# Patient Record
Sex: FEMALE | Race: WHITE | NOT HISPANIC OR LATINO | Employment: OTHER | URBAN - METROPOLITAN AREA
[De-identification: names, ages, dates, MRNs, and addresses within clinical notes are randomized per-mention and may not be internally consistent; named-entity substitution may affect disease eponyms.]

---

## 2017-02-17 ENCOUNTER — OFFICE VISIT (OUTPATIENT)
Dept: FAMILY MEDICINE | Facility: CLINIC | Age: 67
End: 2017-02-17
Payer: COMMERCIAL

## 2017-02-17 VITALS
WEIGHT: 139 LBS | OXYGEN SATURATION: 98 % | HEART RATE: 88 BPM | DIASTOLIC BLOOD PRESSURE: 68 MMHG | HEIGHT: 63 IN | SYSTOLIC BLOOD PRESSURE: 102 MMHG | RESPIRATION RATE: 16 BRPM | BODY MASS INDEX: 24.63 KG/M2

## 2017-02-17 DIAGNOSIS — M54.41 ACUTE BILATERAL LOW BACK PAIN WITH BILATERAL SCIATICA: Primary | ICD-10-CM

## 2017-02-17 DIAGNOSIS — M54.42 ACUTE BILATERAL LOW BACK PAIN WITH BILATERAL SCIATICA: Primary | ICD-10-CM

## 2017-02-17 PROCEDURE — 99213 OFFICE O/P EST LOW 20 MIN: CPT | Performed by: FAMILY MEDICINE

## 2017-02-17 ASSESSMENT — ANXIETY QUESTIONNAIRES
5. BEING SO RESTLESS THAT IT IS HARD TO SIT STILL: NOT AT ALL
2. NOT BEING ABLE TO STOP OR CONTROL WORRYING: NOT AT ALL
3. WORRYING TOO MUCH ABOUT DIFFERENT THINGS: NOT AT ALL
IF YOU CHECKED OFF ANY PROBLEMS ON THIS QUESTIONNAIRE, HOW DIFFICULT HAVE THESE PROBLEMS MADE IT FOR YOU TO DO YOUR WORK, TAKE CARE OF THINGS AT HOME, OR GET ALONG WITH OTHER PEOPLE: NOT DIFFICULT AT ALL
GAD7 TOTAL SCORE: 0
4. TROUBLE RELAXING: NOT AT ALL
7. FEELING AFRAID AS IF SOMETHING AWFUL MIGHT HAPPEN: NOT AT ALL
6. BECOMING EASILY ANNOYED OR IRRITABLE: NOT AT ALL
1. FEELING NERVOUS, ANXIOUS, OR ON EDGE: NOT AT ALL

## 2017-02-17 NOTE — NURSING NOTE
"Chief Complaint   Patient presents with     Musculoskeletal Problem     Eye Problem       Initial /68 (BP Location: Right arm, Patient Position: Chair, Cuff Size: Adult Regular)  Pulse 88  Ht 5' 3\" (1.6 m)  Wt 139 lb (63 kg)  SpO2 (!) 16%  PF 98 L/min  BMI 24.62 kg/m2 Estimated body mass index is 24.62 kg/(m^2) as calculated from the following:    Height as of this encounter: 5' 3\" (1.6 m).    Weight as of this encounter: 139 lb (63 kg).  Medication Reconciliation: complete       Josefina Harry MA     "

## 2017-02-17 NOTE — MR AVS SNAPSHOT
After Visit Summary   2/17/2017    Martha Hayden    MRN: [id number]           Patient Information     Date Of Birth          1950        Visit Information        Provider Department      2/17/2017 3:20 PM Joey Taylor MD [facility]        Today's Diagnoses     Acute bilateral low back pain with bilateral sciatica    -  1       Follow-ups after your visit        Additional Services     PHYSICAL THERAPY REFERRAL       *This therapy referral will be filtered to a centralized scheduling office at [facility] and the patient will receive a call to schedule an appointment at a [place] location most convenient for them. *     [facility] provides Physical Therapy evaluation and treatment and many specialty services across the [place] system.  If requesting a specialty program, please choose from the list below.    If you have not heard from the scheduling office within 2 business days, please call [phone] for all locations, with the exception of [place], please call [phone].  Treatment: Evaluation & Treatment  Special Instructions/Modalities: none  Special Programs: None    Please be aware that coverage of these services is subject to the terms and limitations of your health insurance plan.  Call member services at your health plan with any benefit or coverage questions.      **Note to Provider:  If you are referring outside of [place] for the therapy appointment, please list the name of the location in the  special instructions  above, print the referral and give to the patient to schedule the appointment.                  Your next 10 appointments already scheduled     Mar 10, 2017  2:00 PM CST   PHYSICAL with Paul Ramos MD   [facility] ([facility])    [address] [place] [id number] [phone]              Who to contact     If you have questions or need  "follow up information about today's clinic visit or your schedule please contact [facility] directly at [phone].  Normal or non-critical lab and imaging results will be communicated to you by MyChart, letter or phone within 4 business days after the clinic has received the results. If you do not hear from us within 7 days, please contact the clinic through [employer] or phone. If you have a critical or abnormal lab result, we will notify you by phone as soon as possible.  Submit refill requests through [employer] or call your pharmacy and they will forward the refill request to us. Please allow 3 business days for your refill to be completed.          Additional Information About Your Visit        [employer] Information     [employer] gives you secure access to your electronic health record. If you see a primary care provider, you can also send messages to your care team and make appointments. If you have questions, please call your primary care clinic.  If you do not have a primary care provider, please call [phone] and they will assist you.        Care EveryWhere ID     This is your Care EveryWhere ID. This could be used by other organizations to access your [place] medical records  [id number]        Your Vitals Were     Pulse Respirations Height Pulse Oximetry BMI (Body Mass Index)       88 16 5' 3\" (1.6 m) 98% 24.62 kg/m2        Blood Pressure from Last 3 Encounters:   02/17/17 102/68   11/28/16 120/68   09/22/16 102/68    Weight from Last 3 Encounters:   02/17/17 139 lb (63 kg)   11/28/16 137 lb 8 oz (62.4 kg)   11/04/16 134 lb (60.8 kg)              We Performed the Following     PHYSICAL THERAPY REFERRAL          Today's Medication Changes          These changes are accurate as of: 2/17/17  3:56 PM.  If you have any questions, ask your nurse or doctor.               Stop taking these medicines if you haven't already. Please contact your care team if you have questions.     [employer] " Susp   Stopped by:  Joey Taylor MD                    Primary Care Provider Office Phone # Fax #    Paul Sabina Ramos [phone]       [address] [address]        Thank you!     Thank you for choosing [facility]  for your care. Our goal is always to provide you with excellent care. Hearing back from our patients is one way we can continue to improve our services. Please take a few minutes to complete the written survey that you may receive in the mail after your visit with us. Thank you!             Your Updated Medication List - Protect others around you: Learn how to safely use, store and throw away your medicines at www.disposemymeds.org.          This list is accurate as of: 2/17/17  3:56 PM.  Always use your most recent med list.                   Brand Name Dispense Instructions for use    ascorbic acid 500 MG tablet    VITAMIN C     Take 500 mg by mouth daily.       calcium carbonate 500 MG tablet    OS-HANS 500 mg [place]. Ca     Take 500 mg by mouth daily       ibuprofen 200 MG tablet    ADVIL/MOTRIN     Take 1-2 tablets by mouth. Every 4-6 hours as needed       MULTIVITAL PO      Take 1 tablet by mouth daily.       VITAMIN D (CHOLECALCIFEROL) PO      Take 4,000 Units by mouth daily       VITAMIN-B COMPLEX PO

## 2017-02-17 NOTE — PROGRESS NOTES
"  SUBJECTIVE:                                                    Martha Hayden is a 66 year old female who presents to clinic today for the following health issues:      Eye(s) Problem      Duration: 3 weeks ago     Description:  Location: pus thingy on bilateral inner upper lid   Pain: no  Redness: no  Discharge: yes, white drainage     Accompanying signs and symptoms: redness on the upper eyelid     History (Trauma, foreign body exposure,): None    Precipitating or alleviating factors (contact use): None    Therapies tried and outcome: warm compresses and washed eyelids with warm soap    Eye symptom are improving.   No new eye products.    Musculoskeletal problem/pain      Duration: 6 weeks     Description  Location: burning pain from bilateral back radiating to the legs, worse on the right side, symptoms are more frequent     Intensity:  moderate    Accompanying signs and symptoms: none    History  Previous similar problem: no   Previous evaluation:  none    Precipitating or alleviating factors:  Trauma or overuse: no   Aggravating factors include: turning over in the bed, sitting      Therapies tried and outcome: Ibuprofen and hot bath relief symptoms          Problem list and histories reviewed & adjusted, as indicated.  Additional history: as documented    Problem list, Medication list, Allergies, and Medical/Social/Surgical histories reviewed in EPIC and updated as appropriate.    ROS:  Constitutional, HEENT, cardiovascular, pulmonary, gi and gu systems are negative, except as otherwise noted.    OBJECTIVE:                                                    /68 (BP Location: Right arm, Patient Position: Chair, Cuff Size: Adult Regular)  Pulse 88  Resp 16  Ht 5' 3\" (1.6 m)  Wt 139 lb (63 kg)  SpO2 98%  BMI 24.62 kg/m2  Body mass index is 24.62 kg/(m^2).  GENERAL: healthy, alert and no distress  EYES: Eyes grossly normal to inspection  MS: no gross musculoskeletal defects noted, + trace lower " extremity edema  SKIN: no suspicious lesions or rashes  Comprehensive back pain exam:  No tenderness, Lower extremity strength functional and equal on both sides, Lower extremity reflexes within normal limits bilaterally and Lower extremity sensation normal and equal on both sides    Diagnostic Test Results:  none      ASSESSMENT/PLAN:                                                      1. Acute bilateral low back pain with bilateral sciatica  - pt declined flexeril and medrol dose pack  - continue Ibuprofen PRN   - PHYSICAL THERAPY REFERRAL  - if not improving then will refer to physical therapy       Joey Taylor MD  [facility]

## 2017-02-18 ASSESSMENT — PATIENT HEALTH QUESTIONNAIRE - PHQ9: SUM OF ALL RESPONSES TO PHQ QUESTIONS 1-9: 0

## 2017-02-18 ASSESSMENT — ANXIETY QUESTIONNAIRES: GAD7 TOTAL SCORE: 0

## 2017-02-22 ENCOUNTER — THERAPY VISIT (OUTPATIENT)
Dept: PHYSICAL THERAPY | Facility: CLINIC | Age: 67
End: 2017-02-22
Payer: COMMERCIAL

## 2017-02-22 DIAGNOSIS — M54.42 ACUTE BILATERAL LOW BACK PAIN WITH LEFT-SIDED SCIATICA: Primary | ICD-10-CM

## 2017-02-22 DIAGNOSIS — M54.41 ACUTE BILATERAL LOW BACK PAIN WITH RIGHT-SIDED SCIATICA: ICD-10-CM

## 2017-02-22 PROCEDURE — 97530 THERAPEUTIC ACTIVITIES: CPT | Mod: GP | Performed by: PHYSICAL THERAPIST

## 2017-02-22 PROCEDURE — 97112 NEUROMUSCULAR REEDUCATION: CPT | Mod: GP | Performed by: PHYSICAL THERAPIST

## 2017-02-22 PROCEDURE — 97162 PT EVAL MOD COMPLEX 30 MIN: CPT | Mod: GP | Performed by: PHYSICAL THERAPIST

## 2017-02-22 NOTE — PROGRESS NOTES
[facility]  System  Physical Exam  General   [facility]            Physical Therapy Initial Examination/Evaluation February 22, 2017   Martha Hayden is a 66 year old female referred to physical therapy by Dr. Joey Taylor for treatment of Acute bilateral low back pain with bilateral sciatica with Precautions/Restrictions/MD instructions E&T   Therapist Assessment:   Clinical Impression: Pt presents to [facility] [place] with primary complaint of acute bilat LBP with bilat sciatica. During testing, pt with increased pain with repeated flexion movements.  Pain no worse or slightly better with repeated extension in standing.  Pt demonstrates posterior pelvic tilt in standing.   Both slump test and SLR testing negative. Pt with minor strength deficits, worse on the R.    Pt will benefit from skilled physical therapy for stabilization program as well as continued education on proper body mechanics for daily activities.   Subjective:  Pt reports back pain started a few weeks ago but is worsenings. She is taking ibuprofen a couple times/day. Pain is on bilat sides radiating down the back and outside of both legs and around hips.   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   Current HEP/exercise regimen: Hip maintenance program from prior PT   Patient's goals are decrease pain, return to walking, be able to sit    Other pertinent PMH: Hx of CA,  multiple sclerosis, sleep disorder/apnea General health as reported by patient: Excellent   Return to MD: prn      Lumbar Spine Evaluation  Static Posture  Foot: Pes planus/cavus: Unremarkable      Lumbar Spine:  Posterior pelvic tilt    Dynamic Movement Screen  Single leg stance observations: Steady, >10s bilat  Double limb squat observations: Posterior pelvic tilt  Single limb squat observations: Steady but with decreased ROM     Trunk Range of Motion  Flexion: WNL, hesitantancy   Extension: limited motion L-L3-4, some burning in buttucks  Side bendin% ROM some burning in buttucks   Rotation: More diffiicult to rotate to the R  Quadriceps/Hip Flexor: Slight hypertonicity      Hip and Knee Strength   MMT Hip Abduction Hip Extension Hip Flexion  Knee Flexion   Left 4/5 2+/5 4+/5 5/5   Right 3+/5 2+/5 4-/5 5/5     Special Tests  Neural tension: Slump test - bilat, SLR - bilat  Reflexes: Decreased patellar reflex on the R  Dermatomes: WNL  Myotomes: WNL    Assessment/Plan:      Patient is a 66 year old female with lumbar complaints.    Patient has the following significant findings with corresponding treatment plan.                Diagnosis 1:  Acute bilateral low back pain with bilateral sciatica  Pain -  hot/cold therapy, manual therapy, education and home program  Decreased ROM/flexibility - manual therapy, therapeutic exercise and home program  Decreased strength - therapeutic exercise, therapeutic activities and home program  Decreased function - therapeutic activities and home program  Impaired posture - neuro re-education, therapeutic activities and home program    Therapy Evaluation Codes:   1) History comprised of:   Personal factors that impact the plan of care:      Anxiety and Past/current experiences.    Comorbidity factors that impact the plan of care are:      Multiple sclerosis and Weakness.     Medications impacting care: None.  2) Examination of Body Systems comprised of:   Body structures and  functions that impact the plan of care:      Hip, Knee and Lumbar spine.   Activity limitations that impact the plan of care are:      Bending, Lifting, Sitting, Standing and Walking.  3) Clinical presentation characteristics are:   Evolving/Changing.  4) Decision-Making    Moderate complexity using standardized patient assessment instrument and/or measureable assessment of functional outcome.  Cumulative Therapy Evaluation is: Moderate complexity.    Previous and current functional limitations:  (See Goal Flow Sheet for this information)    Short term and Long term goals: (See Goal Flow Sheet for this information)     Communication ability:  Patient appears to be able to clearly communicate and understand verbal and written communication and follow directions correctly.  Treatment Explanation - The following has been discussed with the patient:   RX ordered/plan of care  Anticipated outcomes  Possible risks and side effects  This patient would benefit from PT intervention to resume normal activities.   Rehab potential is good.    Frequency:  1 X week, once daily  Duration:  for 8 weeks  Discharge Plan:  Achieve all LTG.  Independent in home treatment program.  Reach maximal therapeutic benefit.    Please refer to the daily flowsheet for treatment today, total treatment time and time spent performing 1:1 timed codes.

## 2017-02-22 NOTE — MR AVS SNAPSHOT
After Visit Summary   2/22/2017    Martha Hayden    MRN: [id number]           Patient Information     Date Of Birth          1950        Visit Information        Provider Department      2/22/2017 4:10 PM Veronica Dominguez, PT [facility] [place]        Today's Diagnoses     Acute bilateral low back pain with left-sided sciatica    -  1    Acute bilateral low back pain with right-sided sciatica           Follow-ups after your visit        Your next 10 appointments already scheduled     Mar 02, 2017  4:10 PM CST   LAURO Spine with Rae Iqbal PT   [facility] [place] ( [facility])    [address] [place] [id number] [phone]            Mar 06, 2017  2:00 PM CST   LAURO Spine with Veronica Dominguez PT   [facility] ( [facility])    [address] [place] [id number] [phone]            Mar 10, 2017  2:00 PM CST   PHYSICAL with Paul Ramos MD   [facility] ([facility])    [address] [place] [id number] [phone]              Who to contact     If you have questions or need follow up information about today's clinic visit or your schedule please contact [facility] directly at [phone].  Normal or non-critical lab and imaging results will be communicated to you by MyChart, letter or phone within 4 business days after the clinic has received the results. If you do not hear from us within 7 days, please contact the clinic through MyChart or phone. If you have a critical or abnormal lab result, we will notify you by phone as soon as possible.  Submit refill requests through [employer] or call your pharmacy and they will forward the refill request to us. Please allow 3 business days for your refill to be completed.           Additional Information About Your Visit        [employer] Information     [employer] gives you secure access to your electronic health record. If you see a primary care provider, you can also send messages to your care team and make appointments. If you have questions, please call your primary care clinic.  If you do not have a primary care provider, please call [phone] and they will assist you.        Care EveryWhere ID     This is your Care EveryWhere ID. This could be used by other organizations to access your [place] medical records  [id number]         Blood Pressure from Last 3 Encounters:   02/17/17 102/68   11/28/16 120/68   09/22/16 102/68    Weight from Last 3 Encounters:   02/17/17 63 kg (139 lb)   11/28/16 62.4 kg (137 lb 8 oz)   11/04/16 60.8 kg (134 lb)              We Performed the Following     HC PT EVAL, MODERATE COMPLEXITY     LAURO INITIAL EVAL REPORT     NEUROMUSCULAR RE-EDUCATION     THERAPEUTIC ACTIVITIES        Primary Care Provider Office Phone # Fax #    Paul Sabina Ramos [phone]       [address] [address]        Thank you!     Thank you for choosing [facility] PHYSICAL THERAPY [place]  for your care. Our goal is always to provide you with excellent care. Hearing back from our patients is one way we can continue to improve our services. Please take a few minutes to complete the written survey that you may receive in the mail after your visit with us. Thank you!             Your Updated Medication List - Protect others around you: Learn how to safely use, store and throw away your medicines at www.disposemymeds.org.          This list is accurate as of: 2/22/17 11:59 PM.  Always use your most recent med list.                   Brand Name Dispense Instructions for use    ascorbic acid 500 MG tablet    VITAMIN C     Take 500 mg by mouth daily.       calcium carbonate 500 MG tablet    OS-HANS 500 mg [place]. Ca      Take 500 mg by mouth daily       ibuprofen 200 MG tablet    ADVIL/MOTRIN     Take 1-2 tablets by mouth. Every 4-6 hours as needed       MULTIVITAL PO      Take 1 tablet by mouth daily.       VITAMIN D (CHOLECALCIFEROL) PO      Take 4,000 Units by mouth daily       VITAMIN-B COMPLEX PO

## 2017-02-23 ENCOUNTER — MYC MEDICAL ADVICE (OUTPATIENT)
Dept: FAMILY MEDICINE | Facility: CLINIC | Age: 67
End: 2017-02-23

## 2017-02-23 PROBLEM — M54.41 ACUTE BILATERAL LOW BACK PAIN WITH RIGHT-SIDED SCIATICA: Status: ACTIVE | Noted: 2017-02-23

## 2017-02-23 PROBLEM — M54.42 ACUTE BILATERAL LOW BACK PAIN WITH LEFT-SIDED SCIATICA: Status: ACTIVE | Noted: 2017-02-23

## 2017-02-24 ENCOUNTER — MYC MEDICAL ADVICE (OUTPATIENT)
Dept: FAMILY MEDICINE | Facility: CLINIC | Age: 67
End: 2017-02-24

## 2017-02-24 NOTE — TELEPHONE ENCOUNTER
Routing to Dr. Taylor.    Dr. Taylor-Please review.  Both myself and SHUKRI Bradford, reviewed patient's chart extensively and cannot locate results.  It appears copy will be sent to you.    Thank you!  MISHEL PickettN, RN

## 2017-02-24 NOTE — TELEPHONE ENCOUNTER
Routing to clinic administration as well: ANDRIA Head, RN and CASS Rosas.    IWONA Stanley, MISHELN, RN

## 2017-02-27 NOTE — TELEPHONE ENCOUNTER
MRI disk and a letter from [facility] clinic on my desk.   Routing it to Dr Taylor for her info.  Per [facility] - if image is done at [facility] or outside clinic, it is saved in PACS and it may not be visible in EPIC.   Just LAURA.

## 2017-03-02 ENCOUNTER — THERAPY VISIT (OUTPATIENT)
Dept: PHYSICAL THERAPY | Facility: CLINIC | Age: 67
End: 2017-03-02
Payer: COMMERCIAL

## 2017-03-02 DIAGNOSIS — M54.41 ACUTE BILATERAL LOW BACK PAIN WITH RIGHT-SIDED SCIATICA: ICD-10-CM

## 2017-03-02 PROCEDURE — 97110 THERAPEUTIC EXERCISES: CPT | Mod: GP | Performed by: PHYSICAL THERAPIST

## 2017-03-02 PROCEDURE — 97140 MANUAL THERAPY 1/> REGIONS: CPT | Mod: GP | Performed by: PHYSICAL THERAPIST

## 2017-03-08 NOTE — PROGRESS NOTES
SUBJECTIVE:                                                            Martha Hayden is a 66 year old female who presents for Preventive Visit.    Are you in the first 12 months of your Medicare coverage?  No    Physical   Annual:     Getting at least 3 servings of Calcium per day::  Yes    Bi-annual eye exam::  Yes    Dental care twice a year::  Yes    Sleep apnea or symptoms of sleep apnea::  None    Diet::  Gluten-free/reduced    Frequency of exercise::  4-5 days/week    Duration of exercise::  15-30 minutes    Taking medications regularly::  Not Applicable    Additional concerns today::  No      COGNITIVE SCREEN  1) Repeat 3 items (Banana, Sunrise, Chair)    2) Clock draw: NORMAL  3) 3 item recall: Recalls 3 objects  Results: 3 items recalled: COGNITIVE IMPAIRMENT LESS LIKELY    Mini-CogTM Copyright S Jono. Licensed by the author for use in [facility] [employer]; reprinted with permission (emerita@[facility]). All rights reserved.        Reviewed and updated as needed this visit by clinical staff  Tobacco  Allergies  Meds  Med Hx  Surg Hx  Fam Hx  Soc Hx        Reviewed and updated as needed this visit by Provider        Social History   Substance Use Topics     Smoking status: Former Smoker     Types: Cigarettes     Start date: 9/10/1966     Quit date: 1/28/1995     Smokeless tobacco: Never Used      Comment: intermittent smoker, no specific pattern     Alcohol use No      Comment: Recovering alcoholic for 23 years       The patient does not drink >3 drinks per day nor >7 drinks per week.             Today's PHQ-2 Score:   PHQ-2 ( 1999 Pfizer) 3/7/2017   Little interest or pleasure in doing things Not at all   Feeling down, depressed or hopeless Not at all   PHQ-2 Score 0       Do you feel safe in your environment - Yes    Do you have a Health Care Directive?: Yes: Advance Directive has been received and scanned.    Current providers sharing in care for this patient include:   Patient Care  Team:  Paul Ramos MD as PCP - General (Family Practice)  Ezekiel Irene DO as MD (Neurology)  Willi Maza, RN as Registered Nurse (Neurology)      Hearing impairment: No    Ability to successfully perform activities of daily living: Yes, no assistance needed     Fall risk:       Home safety:  none identified    The following health maintenance items are reviewed in Epic and correct as of today:  Health Maintenance   Topic Date Due     PNEUMOCOCCAL (2 of 2 - PPSV23) 03/09/2017     DEPRESSION ACTION PLAN Q1 YR (NO INBASKET)  03/09/2017     ADVANCE DIRECTIVE PLANNING Q5 YRS (NO INBASKET)  06/22/2017     PHQ-9 Q6 MONTHS (NO INBASKET)  08/17/2017     INFLUENZA VACCINE (SYSTEM ASSIGNED)  09/01/2017     MAMMO SCREEN Q2 YR (SYSTEM ASSIGNED)  10/08/2017     TETANUS IMMUNIZATION (SYSTEM ASSIGNED)  02/11/2018     FALL RISK ASSESSMENT  02/17/2018     LIPID SCREEN Q5 YR FEMALE (SYSTEM ASSIGNED)  04/14/2020     COLON CANCER SCREEN (SYSTEM ASSIGNED)  11/16/2025     DEXA SCAN SCREENING (SYSTEM ASSIGNED)  Completed     HEPATITIS C SCREENING  Completed     Feeling tired for 2 days.  Was really busy with her work. Overall doing well.   Wants to skip glucose test.     ROS:  C: NEGATIVE for fever, chills, change in weight  I: NEGATIVE for worrisome rashes, moles or lesions  E: NEGATIVE for vision changes or irritation  E/M: NEGATIVE for ear, mouth and throat problems  R: NEGATIVE for significant cough or SOB  B: NEGATIVE for masses, tenderness or discharge  CV: NEGATIVE for chest pain, palpitations or peripheral edema  GI: NEGATIVE for nausea, abdominal pain, heartburn, or change in bowel habits  : NEGATIVE for frequency, dysuria, or hematuria  M: NEGATIVE for significant arthralgias or myalgia  N: NEGATIVE for weakness, dizziness or paresthesias  E: NEGATIVE for temperature intolerance, skin/hair changes  H: NEGATIVE for bleeding problems  P: NEGATIVE for changes in mood or affect    Problem list,  "Medication list, Allergies, and Medical/Social/Surgical histories reviewed in [facility] and updated as appropriate.  OBJECTIVE:                                                            /64 (BP Location: Right arm, Patient Position: Chair, Cuff Size: Adult Regular)  Pulse 81  Temp 97.3  F (36.3  C) (Oral)  Resp 16  Wt 140 lb (63.5 kg)  SpO2 99%  BMI 24.8 kg/m2 Estimated body mass index is 24.8 kg/(m^2) as calculated from the following:    Height as of 2/17/17: 5' 3\" (1.6 m).    Weight as of this encounter: 140 lb (63.5 kg).  EXAM:   GENERAL APPEARANCE: healthy, alert and no distress  EYES: Eyes grossly normal to inspection, PERRL and conjunctivae and sclerae normal  HENT: ear canals and TM's normal, nose and mouth without ulcers or lesions, oropharynx clear and oral mucous membranes moist  NECK: no adenopathy, no asymmetry, masses, or scars and thyroid normal to palpation  RESP: lungs clear to auscultation - no rales, rhonchi or wheezes  BREAST: normal without masses, tenderness or nipple discharge and no palpable axillary masses or adenopathy  CV: regular rate and rhythm, normal S1 S2, no S3 or S4, no murmur, click or rub, no peripheral edema and peripheral pulses strong  ABDOMEN: soft, nontender, no hepatosplenomegaly, no masses and bowel sounds normal  MS: no musculoskeletal defects are noted and gait is age appropriate without ataxia, mild leg swelling.   SKIN: no suspicious lesions or rashes  NEURO: Normal strength and tone, sensory exam grossly normal, mentation intact and speech normal  PSYCH: mentation appears normal and affect normal/bright  Examination chaperoned by Karina Alexander MA  ASSESSMENT / PLAN:                                                            1. Medicare annual wellness visit, subsequent       2. Postmenopausal status     - DX Hip/Pelvis/Spine; Future    3. Screening for hyperlipidemia     - LIPID REFLEX TO DIRECT LDL PANEL    4. Need for prophylactic vaccination against Streptococcus " "pneumoniae (pneumococcus)     - PNEUMOVOCCAL VACCINE 23 VALENT (PNEUMOVAX 23)    End of Life Planning:  Patient currently has an advanced directive: No.  I have verified the patient's ablity to prepare an advanced directive/make health care decisions.  Literature was provided to assist patient in preparing an advanced directive.    COUNSELING:  Reviewed preventive health counseling, as reflected in patient instructions  Special attention given to:       Regular exercise       Healthy diet/nutrition       Vision screening       Hearing screening       Dental care       Colon cancer screening        Estimated body mass index is 24.8 kg/(m^2) as calculated from the following:    Height as of 2/17/17: 5' 3\" (1.6 m).    Weight as of this encounter: 140 lb (63.5 kg).     reports that she quit smoking about 22 years ago. Her smoking use included Cigarettes. She started smoking about 50 years ago. She has never used smokeless tobacco.      Appropriate preventive services were discussed with this patient, including applicable screening as appropriate for cardiovascular disease, diabetes, osteopenia/osteoporosis, and glaucoma.  As appropriate for age/gender, discussed screening for colorectal cancer, prostate cancer, breast cancer, and cervical cancer. Checklist reviewing preventive services available has been given to the patient.    Reviewed patients plan of care and provided an AVS. The Basic Care Plan (routine screening as documented in Health Maintenance) for Martha meets the Care Plan requirement. This Care Plan has been established and reviewed with the Patient.    Counseling Resources:  ATP IV Guidelines  Pooled Cohorts Equation Calculator  Breast Cancer Risk Calculator  FRAX Risk Assessment  ICSI Preventive Guidelines  Dietary Guidelines for Americans, 2010  USDA's MyPlate  ASA Prophylaxis  Lung CA Screening    Paul Ramos MD   [facility]  Answers for HPI/ROS submitted by the patient on " 3/7/2017   Q1: Little interest or pleasure in doing things: 0=Not at all  Q2: Feeling down, depressed or hopeless: 0=Not at all  PHQ-2 Score: 0

## 2017-03-10 ENCOUNTER — OFFICE VISIT (OUTPATIENT)
Dept: FAMILY MEDICINE | Facility: CLINIC | Age: 67
End: 2017-03-10
Payer: COMMERCIAL

## 2017-03-10 VITALS
DIASTOLIC BLOOD PRESSURE: 64 MMHG | WEIGHT: 140 LBS | HEART RATE: 81 BPM | RESPIRATION RATE: 16 BRPM | SYSTOLIC BLOOD PRESSURE: 102 MMHG | TEMPERATURE: 97.3 F | OXYGEN SATURATION: 99 % | BODY MASS INDEX: 24.8 KG/M2

## 2017-03-10 DIAGNOSIS — Z23 NEED FOR PROPHYLACTIC VACCINATION AGAINST STREPTOCOCCUS PNEUMONIAE (PNEUMOCOCCUS): ICD-10-CM

## 2017-03-10 DIAGNOSIS — Z13.220 SCREENING FOR HYPERLIPIDEMIA: ICD-10-CM

## 2017-03-10 DIAGNOSIS — Z00.00 MEDICARE ANNUAL WELLNESS VISIT, SUBSEQUENT: Primary | ICD-10-CM

## 2017-03-10 DIAGNOSIS — Z78.0 POSTMENOPAUSAL STATUS: ICD-10-CM

## 2017-03-10 PROCEDURE — 90732 PPSV23 VACC 2 YRS+ SUBQ/IM: CPT | Performed by: FAMILY MEDICINE

## 2017-03-10 PROCEDURE — 80061 LIPID PANEL: CPT | Performed by: FAMILY MEDICINE

## 2017-03-10 PROCEDURE — G0438 PPPS, INITIAL VISIT: HCPCS | Performed by: FAMILY MEDICINE

## 2017-03-10 PROCEDURE — 36415 COLL VENOUS BLD VENIPUNCTURE: CPT | Performed by: FAMILY MEDICINE

## 2017-03-10 PROCEDURE — G0009 ADMIN PNEUMOCOCCAL VACCINE: HCPCS | Performed by: FAMILY MEDICINE

## 2017-03-10 NOTE — NURSING NOTE
"Chief Complaint   Patient presents with     Physical       Initial /64 (BP Location: Right arm, Patient Position: Chair, Cuff Size: Adult Regular)  Pulse 81  Temp 97.3  F (36.3  C) (Oral)  Resp 16  Wt 140 lb (63.5 kg)  SpO2 99%  BMI 24.8 kg/m2 Estimated body mass index is 24.8 kg/(m^2) as calculated from the following:    Height as of 2/17/17: 5' 3\" (1.6 m).    Weight as of this encounter: 140 lb (63.5 kg).  Medication Reconciliation: complete     Karina Alexander, CMA    "

## 2017-03-10 NOTE — LETTER
My Depression Action Plan  Name: Martha Hayden   Date of Birth 1950  Date: 3/10/2017    My doctor: Paul Ramos   My clinic: [address] [address] [phone]          GREEN    ZONE   Good Control    What it looks like:     Things are going generally well. You have normal up s and down s. You may even feel depressed from time to time, but bad moods usually last less than a day.   What you need to do:  1. Continue to care for yourself (see self care plan)  2. Check your depression survival kit and update it as needed  3. Follow your physician s recommendations including any medication.  4. Do not stop taking medication unless you consult with your physician first.           YELLOW         ZONE Getting Worse    What it looks like:     Depression is starting to interfere with your life.     It may be hard to get out of bed; you may be starting to isolate yourself from others.    Symptoms of depression are starting to last most all day and this has happened for several days.     You may have suicidal thoughts but they are not constant.   What you need to do:     1. Call your care team, your response to treatment will improve if you keep your care team informed of your progress. Yellow periods are signs an adjustment may need to be made.     2. Continue your self-care, even if you have to fake it!    3. Talk to someone in your support network    4. Open up your depression survival kit           RED    ZONE Medical Alert - Get Help    What it looks like:     Depression is seriously interfering with your life.     You may experience these or other symptoms: You can t get out of bed most days, can t work or engage in other necessary activities, you have trouble taking care of basic hygiene, or basic responsibilities, thoughts of suicide or death that will not go away, self-injurious behavior.     What you need to do:  1. Call your  care team and request a same-day appointment. If they are not available (weekends or after hours) call your local crisis line, emergency room or 911.      Electronically signed by: Karina Alexander, March 10, 2017    Depression Self Care Plan / Survival Kit    Self-Care for Depression  Here s the deal. Your body and mind are really not as separate as most people think.  What you do and think affects how you feel and how you feel influences what you do and think. This means if you do things that people who feel good do, it will help you feel better.  Sometimes this is all it takes.  There is also a place for medication and therapy depending on how severe your depression is, so be sure to consult with your medical provider and/ or Behavioral Health Consultant if your symptoms are worsening or not improving.     In order to better manage my stress, I will:    Exercise  Get some form of exercise, every day. This will help reduce pain and release endorphins, the  feel good  chemicals in your brain. This is almost as good as taking antidepressants!  This is not the same as joining a gym and then never going! (they count on that by the way ) It can be as simple as just going for a walk or doing some gardening, anything that will get you moving.      Hygiene   Maintain good hygiene (Get out of bed in the morning, Make your bed, Brush your teeth, Take a shower, and Get dressed like you were going to work, even if you are unemployed).  If your clothes don't fit try to get ones that do.    Diet  I will strive to eat foods that are good for me, drink plenty of water, and avoid excessive sugar, caffeine, alcohol, and other mood-altering substances.  Some foods that are helpful in depression are: complex carbohydrates, B vitamins, flaxseed, fish or fish oil, fresh fruits and vegetables.    Psychotherapy  I agree to participate in Individual Therapy (if recommended).    Medication  If prescribed medications, I agree to take them.   Missing doses can result in serious side effects.  I understand that drinking alcohol, or other illicit drug use, may cause potential side effects.  I will not stop my medication abruptly without first discussing it with my provider.    Staying Connected With Others  I will stay in touch with my friends, family members, and my primary care provider/team.    Use your imagination  Be creative.  We all have a creative side; it doesn t matter if it s oil painting, sand castles, or mud pies! This will also kick up the endorphins.    Witness Beauty  (AKA stop and smell the roses) Take a look outside, even in mid-winter. Notice colors, textures. Watch the squirrels and birds.     Service to others  Be of service to others.  There is always someone else in need.  By helping others we can  get out of ourselves  and remember the really important things.  This also provides opportunities for practicing all the other parts of the program.    Humor  Laugh and be silly!  Adjust your TV habits for less news and crime-drama and more comedy.    Control your stress  Try breathing deep, massage therapy, biofeedback, and meditation. Find time to relax each day.     My support system    Clinic Contact:  Phone number:    Contact 1:  Phone number:    Contact 2:  Phone number:    Uats[place]/:  Phone number:    Therapist:  Phone number:    Local crisis center:    Phone number:    Other community support:  Phone number:

## 2017-03-10 NOTE — MR AVS SNAPSHOT
After Visit Summary   3/10/2017    Martha Hayden    MRN: [id number]           Patient Information     Date Of Birth          1950        Visit Information        Provider Department      3/10/2017 2:00 PM Paul Ramos MD [facility]        Today's Diagnoses     Medicare annual wellness visit, subsequent    -  1    Postmenopausal status        Screening for hyperlipidemia        Need for prophylactic vaccination against Streptococcus pneumoniae (pneumococcus)          Care Instructions      Preventive Health Recommendations    Female Ages 65 +    Yearly exam:     See your health care provider every year in order to  o Review health changes.   o Discuss preventive care.    o Review your medicines if your doctor has prescribed any.      You no longer need a yearly Pap test unless you've had an abnormal Pap test in the past 10 years. If you have vaginal symptoms, such as bleeding or discharge, be sure to talk with your provider about a Pap test.      Every 1 to 2 years, have a mammogram.  If you are over 69, talk with your health care provider about whether or not you want to continue having screening mammograms.      Every 10 years, have a colonoscopy. Or, have a yearly FIT test (stool test). These exams will check for colon cancer.       Have a cholesterol test every 5 years, or more often if your doctor advises it.       Have a diabetes test (fasting glucose) every three years. If you are at risk for diabetes, you should have this test more often.       At age 65, have a bone density scan (DEXA) to check for osteoporosis (brittle bone disease).    Shots:    Get a flu shot each year.    Get a tetanus shot every 10 years.    Talk to your doctor about your pneumonia vaccines. There are now two you should receive - Pneumovax (PPSV 23) and Prevnar (PCV 13).    Talk to your doctor about the shingles vaccine.    Talk to your doctor about the hepatitis B  vaccine.    Nutrition:     Eat at least 5 servings of fruits and vegetables each day.      Eat whole-grain bread, whole-wheat pasta and brown rice instead of white grains and rice.      Talk to your provider about Calcium and Vitamin D.     Lifestyle    Exercise at least 150 minutes a week (30 minutes a day, 5 days a week). This will help you control your weight and prevent disease.      Limit alcohol to one drink per day.      No smoking.       Wear sunscreen to prevent skin cancer.       See your dentist twice a year for an exam and cleaning.      See your eye doctor every 1 to 2 years to screen for conditions such as glaucoma, macular degeneration and cataracts.        Follow-ups after your visit        Your next 10 appointments already scheduled     Mar 13, 2017  1:30 PM CDT   [facility] Spine with Rae Iqbal PT   [facility]    [address] [place] [id number] [phone]              Future tests that were ordered for you today     Open Future Orders        Priority Expected Expires Ordered    DX Hip/Pelvis/Spine Routine  3/10/2018 3/10/2017            Who to contact     If you have questions or need follow up information about today's clinic visit or your schedule please contact [facility] directly at [phone].  Normal or non-critical lab and imaging results will be communicated to you by [employer], letter or phone within 4 business days after the clinic has received the results. If you do not hear from us within 7 days, please contact the clinic through [employer] or phone. If you have a critical or abnormal lab result, we will notify you by phone as soon as possible.  Submit refill requests through [employer] or call your pharmacy and they will forward the refill request to us. Please allow 3 business days for your refill to be completed.          Additional Information About Your Visit        [employer]  Information     [employer] gives you secure access to your electronic health record. If you see a primary care provider, you can also send messages to your care team and make appointments. If you have questions, please call your primary care clinic.  If you do not have a primary care provider, please call [phone] and they will assist you.        Care EveryWhere ID     This is your Care EveryWhere ID. This could be used by other organizations to access your [place] medical records  [id number]        Your Vitals Were     Pulse Temperature Respirations Pulse Oximetry BMI (Body Mass Index)       81 97.3  F (36.3  C) (Oral) 16 99% 24.8 kg/m2        Blood Pressure from Last 3 Encounters:   03/10/17 102/64   02/17/17 102/68   11/28/16 120/68    Weight from Last 3 Encounters:   03/10/17 140 lb (63.5 kg)   02/17/17 139 lb (63 kg)   11/28/16 137 lb 8 oz (62.4 kg)              We Performed the Following     DEPRESSION ACTION PLAN (DAP)     LIPID REFLEX TO DIRECT LDL PANEL     PNEUMOVOCCAL VACCINE 23 VALENT (PNEUMOVAX 23)        Primary Care Provider Office Phone # Fax #    Paul Saibna Ramos [phone]       [place] [phone]        Thank you!     Thank you for choosing [facility]  for your care. Our goal is always to provide you with excellent care. Hearing back from our patients is one way we can continue to improve our services. Please take a few minutes to complete the written survey that you may receive in the mail after your visit with us. Thank you!             Your Updated Medication List - Protect others around you: Learn how to safely use, store and throw away your medicines at www.disposemymeds.org.          This list is accurate as of: 3/10/17  2:40 PM.  Always use your most recent med list.                   Brand Name Dispense Instructions for use    ascorbic acid 500 MG tablet    VITAMIN C     Take 500 mg by mouth daily.        calcium carbonate 500 MG tablet    OS-HANS 500 mg [place]. Ca     Take 500 mg by mouth daily       ibuprofen 200 MG tablet    ADVIL/MOTRIN     Take 1-2 tablets by mouth. Every 4-6 hours as needed       MULTIVITAL PO      Take 1 tablet by mouth daily.       VITAMIN D (CHOLECALCIFEROL) PO      Take 4,000 Units by mouth daily       VITAMIN-B COMPLEX PO

## 2017-03-11 LAB
CHOLEST SERPL-MCNC: 197 MG/DL
HDLC SERPL-MCNC: 67 MG/DL
LDLC SERPL CALC-MCNC: 105 MG/DL
NONHDLC SERPL-MCNC: 130 MG/DL
TRIGL SERPL-MCNC: 125 MG/DL

## 2017-03-13 ENCOUNTER — THERAPY VISIT (OUTPATIENT)
Dept: PHYSICAL THERAPY | Facility: CLINIC | Age: 67
End: 2017-03-13
Payer: COMMERCIAL

## 2017-03-13 DIAGNOSIS — M54.41 ACUTE BILATERAL LOW BACK PAIN WITH RIGHT-SIDED SCIATICA: ICD-10-CM

## 2017-03-13 PROCEDURE — 97112 NEUROMUSCULAR REEDUCATION: CPT | Mod: GP | Performed by: PHYSICAL THERAPIST

## 2017-03-13 PROCEDURE — 97110 THERAPEUTIC EXERCISES: CPT | Mod: GP | Performed by: PHYSICAL THERAPIST

## 2017-03-13 PROCEDURE — 97140 MANUAL THERAPY 1/> REGIONS: CPT | Mod: GP | Performed by: PHYSICAL THERAPIST

## 2017-03-29 ENCOUNTER — THERAPY VISIT (OUTPATIENT)
Dept: PHYSICAL THERAPY | Facility: CLINIC | Age: 67
End: 2017-03-29
Payer: COMMERCIAL

## 2017-03-29 DIAGNOSIS — M54.41 ACUTE BILATERAL LOW BACK PAIN WITH RIGHT-SIDED SCIATICA: ICD-10-CM

## 2017-03-29 PROCEDURE — 97112 NEUROMUSCULAR REEDUCATION: CPT | Mod: GP | Performed by: PHYSICAL THERAPIST

## 2017-03-29 PROCEDURE — 97110 THERAPEUTIC EXERCISES: CPT | Mod: GP | Performed by: PHYSICAL THERAPIST

## 2017-03-29 PROCEDURE — 97530 THERAPEUTIC ACTIVITIES: CPT | Mod: GP | Performed by: PHYSICAL THERAPIST

## 2017-03-29 NOTE — PROGRESS NOTES
Subjective:    HPI                    Objective:    System    Physical Exam    General     ROS    Assessment/Plan:      PROGRESS  REPORT    Progress reporting period is from 2/22/2017 to 3/29/2017.       SUBJECTIVE  I was able to walk 4 miles while I was in [place].  Pain today 0/10.  I have had pain a couple of times with ambulatory activity  I took some advil and used an ice pack.  Pain stayed at 2-3/10.      Current pain level is 0/10.     Previous pain level was 6/10.   Changes in function:  Yes, pain upon rising off of ball following prone extension today.  Maintained neutral spine with home program, focusing on stability.  Adverse reaction to treatment or activity: None    OBJECTIVE  Changes noted in objective findings:  The objective findings below are from DOS 3/29/2017.  Objective: Pt demonstrates tolerance for progressive stabiltiy challenge with ball.  Integrated into home program.  Education on MRI findings and impact for long term health considerations.      ASSESSMENT/PLAN  Updated problem list and treatment plan: Diagnosis 1:  LBP    Pain -  hot/cold therapy, US, manual therapy, self management, education, directional preference exercise and home program  Decreased joint mobility - manual therapy and therapeutic exercise  Decreased strength - therapeutic exercise and therapeutic activities  Impaired muscle performance - neuro re-education  Decreased function - therapeutic activities  Impaired posture - neuro re-education  STG/LTGs have been met or progress has been made towards goals:  Yes (See Goal flow sheet completed today.)  Assessment of Progress: The patient's condition has potential to improve.  Self Management Plans:  Patient has been instructed in a home treatment program.  Patient  has been instructed in self management of symptoms.  I have re-evaluated this patient and find that the nature, scope, duration and intensity of the therapy is appropriate for the medical condition of the  patientHaider Thomas continues to require the following intervention to meet STG and LTG's:  PT    Recommendations:  This patient would benefit from continued therapy.     Frequency:  2 X a month, once daily  Duration:  for 2 months        Please refer to the daily flowsheet for treatment today, total treatment time and time spent performing 1:1 timed codes.

## 2017-03-29 NOTE — MR AVS SNAPSHOT
After Visit Summary   3/29/2017    Martha Hayden    MRN: [id number]           Patient Information     Date Of Birth          1950        Visit Information        Provider Department      3/29/2017 1:30 PM Rae Iqbal, PT [facility]        Today's Diagnoses     Acute bilateral low back pain with right-sided sciatica           Follow-ups after your visit        Your next 10 appointments already scheduled     Apr 12, 2017  1:30 PM CDT   [facility] Spine with Rae Iqbal PT   [facility] ( [facility])    [address] [place] [address] [id number] [phone]              Who to contact     If you have questions or need follow up information about today's clinic visit or your schedule please contact [facility] directly at [phone].  Normal or non-critical lab and imaging results will be communicated to you by Sophie & Juliethart, letter or phone within 4 business days after the clinic has received the results. If you do not hear from us within 7 days, please contact the clinic through Sophie [employer] Juliethart or phone. If you have a critical or abnormal lab result, we will notify you by phone as soon as possible.  Submit refill requests through Nascentric or call your pharmacy and they will forward the refill request to us. Please allow 3 business days for your refill to be completed.          Additional Information About Your Visit        Sophie & Juliethart Information     Nascentric gives you secure access to your electronic health record. If you see a primary care provider, you can also send messages to your care team and make appointments. If you have questions, please call your primary care clinic.  If you do not have a primary care provider, please call [phone] and they will assist you.        Care EveryWhere ID     This is your Care EveryWhere ID. This could be used by other  organizations to access your [place] medical records  [id number]         Blood Pressure from Last 3 Encounters:   03/10/17 102/64   02/17/17 102/68   11/28/16 120/68    Weight from Last 3 Encounters:   03/10/17 63.5 kg (140 lb)   02/17/17 63 kg (139 lb)   11/28/16 62.4 kg (137 lb 8 oz)              We Performed the Following     Neuromuscular Re-Education     Therapeutic Activities     Therapeutic Exercises        Primary Care Provider Office Phone # Fax #    Paul Sabina Ramos [phone]       [address] [address]        Thank you!     Thank you for choosing [facility] [place]  for your care. Our goal is always to provide you with excellent care. Hearing back from our patients is one way we can continue to improve our services. Please take a few minutes to complete the written survey that you may receive in the mail after your visit with us. Thank you!             Your Updated Medication List - Protect others around you: Learn how to safely use, store and throw away your medicines at www.disposemymeds.org.          This list is accurate as of: 3/29/17  2:29 PM.  Always use your most recent med list.                   Brand Name Dispense Instructions for use    ascorbic acid 500 MG tablet    VITAMIN C     Take 500 mg by mouth daily.       calcium carbonate 500 MG tablet    OS-HANS 500 mg [place]. Ca     Take 500 mg by mouth daily       ibuprofen 200 MG tablet    ADVIL/MOTRIN     Take 1-2 tablets by mouth. Every 4-6 hours as needed       MULTIVITAL PO      Take 1 tablet by mouth daily.       VITAMIN D (CHOLECALCIFEROL) PO      Take 4,000 Units by mouth daily       VITAMIN-B COMPLEX PO

## 2017-04-12 ENCOUNTER — THERAPY VISIT (OUTPATIENT)
Dept: PHYSICAL THERAPY | Facility: CLINIC | Age: 67
End: 2017-04-12
Payer: COMMERCIAL

## 2017-04-12 DIAGNOSIS — M54.41 ACUTE BILATERAL LOW BACK PAIN WITH RIGHT-SIDED SCIATICA: ICD-10-CM

## 2017-04-12 PROCEDURE — 97530 THERAPEUTIC ACTIVITIES: CPT | Mod: GP | Performed by: PHYSICAL THERAPIST

## 2017-04-12 PROCEDURE — 97110 THERAPEUTIC EXERCISES: CPT | Mod: GP | Performed by: PHYSICAL THERAPIST

## 2017-04-12 PROCEDURE — 97112 NEUROMUSCULAR REEDUCATION: CPT | Mod: GP | Performed by: PHYSICAL THERAPIST

## 2017-04-18 ENCOUNTER — RADIANT APPOINTMENT (OUTPATIENT)
Dept: BONE DENSITY | Facility: CLINIC | Age: 67
End: 2017-04-18
Attending: FAMILY MEDICINE
Payer: COMMERCIAL

## 2017-04-18 DIAGNOSIS — Z78.0 POSTMENOPAUSAL STATUS: ICD-10-CM

## 2017-04-18 PROCEDURE — 77080 DXA BONE DENSITY AXIAL: CPT | Performed by: INTERNAL MEDICINE

## 2017-04-26 ENCOUNTER — THERAPY VISIT (OUTPATIENT)
Dept: PHYSICAL THERAPY | Facility: CLINIC | Age: 67
End: 2017-04-26
Payer: COMMERCIAL

## 2017-04-26 DIAGNOSIS — M54.41 ACUTE BILATERAL LOW BACK PAIN WITH RIGHT-SIDED SCIATICA: ICD-10-CM

## 2017-04-26 PROCEDURE — 97110 THERAPEUTIC EXERCISES: CPT | Mod: GP | Performed by: PHYSICAL THERAPIST

## 2017-04-26 PROCEDURE — 97112 NEUROMUSCULAR REEDUCATION: CPT | Mod: GP | Performed by: PHYSICAL THERAPIST

## 2017-06-27 ENCOUNTER — MYC MEDICAL ADVICE (OUTPATIENT)
Dept: FAMILY MEDICINE | Facility: CLINIC | Age: 67
End: 2017-06-27

## 2017-06-27 ENCOUNTER — OFFICE VISIT (OUTPATIENT)
Dept: FAMILY MEDICINE | Facility: CLINIC | Age: 67
End: 2017-06-27
Payer: COMMERCIAL

## 2017-06-27 VITALS
HEART RATE: 86 BPM | DIASTOLIC BLOOD PRESSURE: 72 MMHG | SYSTOLIC BLOOD PRESSURE: 108 MMHG | OXYGEN SATURATION: 99 % | WEIGHT: 137.75 LBS | TEMPERATURE: 98.7 F | BODY MASS INDEX: 24.4 KG/M2

## 2017-06-27 DIAGNOSIS — F41.9 ANXIETY: ICD-10-CM

## 2017-06-27 DIAGNOSIS — F32.1 MODERATE MAJOR DEPRESSION (H): Primary | ICD-10-CM

## 2017-06-27 DIAGNOSIS — H81.13 BPPV (BENIGN PAROXYSMAL POSITIONAL VERTIGO), BILATERAL: ICD-10-CM

## 2017-06-27 PROCEDURE — 99214 OFFICE O/P EST MOD 30 MIN: CPT | Performed by: FAMILY MEDICINE

## 2017-06-27 RX ORDER — PROPRANOLOL HYDROCHLORIDE 20 MG/1
10-20 TABLET ORAL 4 TIMES DAILY PRN
Qty: 60 TABLET | Refills: 1 | Status: SHIPPED | OUTPATIENT
Start: 2017-06-27 | End: 2017-08-23

## 2017-06-27 ASSESSMENT — ANXIETY QUESTIONNAIRES
1. FEELING NERVOUS, ANXIOUS, OR ON EDGE: NEARLY EVERY DAY
5. BEING SO RESTLESS THAT IT IS HARD TO SIT STILL: MORE THAN HALF THE DAYS
GAD7 TOTAL SCORE: 10
GAD7 TOTAL SCORE: 10
6. BECOMING EASILY ANNOYED OR IRRITABLE: NOT AT ALL
5. BEING SO RESTLESS THAT IT IS HARD TO SIT STILL: MORE THAN HALF THE DAYS
GAD7 TOTAL SCORE: 10
3. WORRYING TOO MUCH ABOUT DIFFERENT THINGS: SEVERAL DAYS
3. WORRYING TOO MUCH ABOUT DIFFERENT THINGS: SEVERAL DAYS
6. BECOMING EASILY ANNOYED OR IRRITABLE: NOT AT ALL
7. FEELING AFRAID AS IF SOMETHING AWFUL MIGHT HAPPEN: NOT AT ALL
GAD7 TOTAL SCORE: 10
4. TROUBLE RELAXING: NEARLY EVERY DAY
2. NOT BEING ABLE TO STOP OR CONTROL WORRYING: SEVERAL DAYS
2. NOT BEING ABLE TO STOP OR CONTROL WORRYING: SEVERAL DAYS
1. FEELING NERVOUS, ANXIOUS, OR ON EDGE: NEARLY EVERY DAY
7. FEELING AFRAID AS IF SOMETHING AWFUL MIGHT HAPPEN: NOT AT ALL
7. FEELING AFRAID AS IF SOMETHING AWFUL MIGHT HAPPEN: NOT AT ALL

## 2017-06-27 ASSESSMENT — PATIENT HEALTH QUESTIONNAIRE - PHQ9
5. POOR APPETITE OR OVEREATING: NEARLY EVERY DAY
SUM OF ALL RESPONSES TO PHQ QUESTIONS 1-9: 10
SUM OF ALL RESPONSES TO PHQ QUESTIONS 1-9: 10
10. IF YOU CHECKED OFF ANY PROBLEMS, HOW DIFFICULT HAVE THESE PROBLEMS MADE IT FOR YOU TO DO YOUR WORK, TAKE CARE OF THINGS AT HOME, OR GET ALONG WITH OTHER PEOPLE: SOMEWHAT DIFFICULT

## 2017-06-27 NOTE — NURSING NOTE
"Chief Complaint   Patient presents with     Depression     Anxiety       Initial /72 (Cuff Size: Adult Regular)  Pulse 86  Temp 98.7  F (37.1  C) (Oral)  Wt 137 lb 12 oz (62.5 kg)  SpO2 99%  BMI 24.4 kg/m2 Estimated body mass index is 24.4 kg/(m^2) as calculated from the following:    Height as of 2/17/17: 5' 3\" (1.6 m).    Weight as of this encounter: 137 lb 12 oz (62.5 kg).  Medication Reconciliation: complete     Jania Chester, JORGE      "

## 2017-06-27 NOTE — MR AVS SNAPSHOT
After Visit Summary   6/27/2017    Martha Hayden    MRN: [id number]           Patient Information     Date Of Birth          1950        Visit Information        Provider Department      6/27/2017 2:00 PM Paul Ramos MD [facility]        Today's Diagnoses     Moderate major depression (H)    -  1    Anxiety           Follow-ups after your visit        Additional Services     MENTAL HEALTH REFERRAL       Your provider has referred you to: Behavioral Healthcare Providers Intake Scheduling [phone]  [email or web address] - (1)therapist. (2) psychologist who does ADHD evaluation     All scheduling is subject to the client's specific insurance plan & benefits, provider/location availability, and provider clinical specialities.  Please arrive 15 minutes early for your first appointment and bring your completed paperwork.    Please be aware that coverage of these services is subject to the terms and limitations of your health insurance plan.  Call member services at your health plan with any benefit or coverage questions.                  Who to contact     If you have questions or need follow up information about today's clinic visit or your schedule please contact [facility] directly at [phone].  Normal or non-critical lab and imaging results will be communicated to you by MyChart, letter or phone within 4 business days after the clinic has received the results. If you do not hear from us within 7 days, please contact the clinic through ZAPRhart or phone. If you have a critical or abnormal lab result, we will notify you by phone as soon as possible.  Submit refill requests through Hugo [employer] Debra Natural or call your pharmacy and they will forward the refill request to us. Please allow 3 business days for your refill to be completed.          Additional Information About Your Visit        MyChart Information     [employer] gives you secure access to your  electronic health record. If you see a primary care provider, you can also send messages to your care team and make appointments. If you have questions, please call your primary care clinic.  If you do not have a primary care provider, please call [phone] and they will assist you.        Care EveryWhere ID     This is your Care EveryWhere ID. This could be used by other organizations to access your [place] medical records  [id number]        Your Vitals Were     Pulse Temperature Pulse Oximetry BMI (Body Mass Index)          86 98.7  F (37.1  C) (Oral) 99% 24.4 kg/m2         Blood Pressure from Last 3 Encounters:   06/27/17 108/72   03/10/17 102/64   02/17/17 102/68    Weight from Last 3 Encounters:   06/27/17 137 lb 12 oz (62.5 kg)   03/10/17 140 lb (63.5 kg)   02/17/17 139 lb (63 kg)              We Performed the Following     MENTAL HEALTH REFERRAL          Today's Medication Changes          These changes are accurate as of: 6/27/17  2:29 PM.  If you have any questions, ask your nurse or doctor.               Start taking these medicines.        Dose/Directions    propranolol 20 MG tablet   Commonly known as:  INDERAL   Used for:  Anxiety   Started by:  Paul Ramos MD        Dose:  10-20 mg   Take 0.5-1 tablets (10-20 mg) by mouth 4 times daily as needed   Quantity:  60 tablet   Refills:  1            Where to get your medicines      [facility] [address] [place] [address] AT [address] [place] [id number]     Phone:  [phone]     propranolol 20 MG tablet                Primary Care Provider Office Phone # Fax #    Paul Ramos [phone]       [address] [address]        Equal Access to Services     LIVE FREITAS AH: Harry Hennessy, linda toth, aubrie carmichael  marisabel reyes ah. So [facility] [phone].    ATENCIÓN: Si long seymour, tiene a rodriguez disposición servicios gratuitos de asistencia lingüística. Cyn barnett [phone].    We comply with applicable federal civil rights laws and [place] laws. We do not discriminate on the basis of race, color, national origin, age, disability sex, sexual orientation or gender identity.            Thank you!     Thank you for choosing [facility]  for your care. Our goal is always to provide you with excellent care. Hearing back from our patients is one way we can continue to improve our services. Please take a few minutes to complete the written survey that you may receive in the mail after your visit with us. Thank you!             Your Updated Medication List - Protect others around you: Learn how to safely use, store and throw away your medicines at www.disposemymeds.org.          This list is accurate as of: 6/27/17  2:29 PM.  Always use your most recent med list.                   Brand Name Dispense Instructions for use Diagnosis    ascorbic acid 500 MG tablet    VITAMIN C     Take 500 mg by mouth daily.        calcium carbonate 1250 MG tablet    OS-HANS 500 mg [place]. Ca     Take 500 mg by mouth daily        ibuprofen 200 MG tablet    ADVIL/MOTRIN     Take 1-2 tablets by mouth. Every 4-6 hours as needed        MULTIVITAL PO      Take 1 tablet by mouth daily.        propranolol 20 MG tablet    INDERAL    60 tablet    Take 0.5-1 tablets (10-20 mg) by mouth 4 times daily as needed    Anxiety       VITAMIN D (CHOLECALCIFEROL) PO      Take 4,000 Units by mouth daily        VITAMIN-B COMPLEX PO
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
## 2017-06-28 ASSESSMENT — ANXIETY QUESTIONNAIRES: GAD7 TOTAL SCORE: 10

## 2017-06-28 NOTE — TELEPHONE ENCOUNTER
Dr. Ramos-Please review patient's response and may close encounter.    Thank you!  MISHEL PickettN, RN

## 2017-07-31 ENCOUNTER — MYC MEDICAL ADVICE (OUTPATIENT)
Dept: FAMILY MEDICINE | Facility: CLINIC | Age: 67
End: 2017-07-31

## 2017-08-04 ENCOUNTER — TRANSFERRED RECORDS (OUTPATIENT)
Dept: HEALTH INFORMATION MANAGEMENT | Facility: CLINIC | Age: 67
End: 2017-08-04

## 2017-08-08 ENCOUNTER — MYC MEDICAL ADVICE (OUTPATIENT)
Dept: FAMILY MEDICINE | Facility: CLINIC | Age: 67
End: 2017-08-08

## 2017-08-08 DIAGNOSIS — F41.9 ANXIETY: Primary | ICD-10-CM

## 2017-08-08 DIAGNOSIS — F32.1 MODERATE MAJOR DEPRESSION (H): ICD-10-CM

## 2017-08-09 NOTE — TELEPHONE ENCOUNTER
She has underlying anxiety and if her ADHD testing is positive, most adhd meds worsens anxiety symptoms.  She would better served by community psychiatrist to manage medications.  I will notify her once I have her ADHD results and my interpretation of it.   She can certainly see me in the clinic after her ADHD evaluation is routed to us (may be next week).  Mental health [facility] psychiatry referral signed.

## 2017-08-09 NOTE — TELEPHONE ENCOUNTER
Routing to provider - Rachel - please review and advise as appropriate  1. Patient update:  ADHD results  2. Do you want office visit to discuss?  3. Last office visit 6/27/2017  4. Please look for results fax from Dr. Elaine    Thank you,  Lois Jacobs RN

## 2017-08-23 DIAGNOSIS — F41.9 ANXIETY: ICD-10-CM

## 2017-08-24 RX ORDER — PROPRANOLOL HYDROCHLORIDE 20 MG/1
TABLET ORAL
Qty: 60 TABLET | Refills: 1 | Status: SHIPPED | OUTPATIENT
Start: 2017-08-24

## 2017-08-24 NOTE — TELEPHONE ENCOUNTER
Medication Detail      Disp Refills Start End JENNIFER   propranolol (INDERAL) 20 MG tablet 60 tablet 1 6/27/2017  --   Sig: Take 0.5-1 tablets (10-20 mg) by mouth 4 times daily as needed         Last Office Visit with FMDESTIN, U[facility]ERI or [facility] prescribing provider:  6/27/17   Future Office Visit:        BP Readings from Last 3 Encounters:   06/27/17 108/72   03/10/17 102/64   02/17/17 102/68

## 2017-10-02 ENCOUNTER — OFFICE VISIT (OUTPATIENT)
Dept: FAMILY MEDICINE | Facility: CLINIC | Age: 67
End: 2017-10-02
Payer: COMMERCIAL

## 2017-10-02 VITALS
OXYGEN SATURATION: 98 % | SYSTOLIC BLOOD PRESSURE: 110 MMHG | HEART RATE: 66 BPM | TEMPERATURE: 98.7 F | WEIGHT: 140.25 LBS | DIASTOLIC BLOOD PRESSURE: 70 MMHG | BODY MASS INDEX: 24.84 KG/M2

## 2017-10-02 DIAGNOSIS — R07.0 THROAT PAIN: Primary | ICD-10-CM

## 2017-10-02 LAB
DEPRECATED S PYO AG THROAT QL EIA: NORMAL
SPECIMEN SOURCE: NORMAL

## 2017-10-02 PROCEDURE — 87081 CULTURE SCREEN ONLY: CPT | Performed by: FAMILY MEDICINE

## 2017-10-02 PROCEDURE — 99213 OFFICE O/P EST LOW 20 MIN: CPT | Performed by: FAMILY MEDICINE

## 2017-10-02 PROCEDURE — 87880 STREP A ASSAY W/OPTIC: CPT | Performed by: FAMILY MEDICINE

## 2017-10-02 NOTE — NURSING NOTE
"Chief Complaint   Patient presents with     Pharyngitis       Initial /70 (Cuff Size: Adult Regular)  Pulse 66  Temp 98.7  F (37.1  C) (Oral)  Wt 140 lb 4 oz (63.6 kg)  SpO2 98%  BMI 24.84 kg/m2 Estimated body mass index is 24.84 kg/(m^2) as calculated from the following:    Height as of 2/17/17: 5' 3\" (1.6 m).    Weight as of this encounter: 140 lb 4 oz (63.6 kg).  Medication Reconciliation: complete     Jania Chester, JORGE      "

## 2017-10-03 LAB
BACTERIA SPEC CULT: NORMAL
SPECIMEN SOURCE: NORMAL

## 2017-10-10 ENCOUNTER — MYC MEDICAL ADVICE (OUTPATIENT)
Dept: FAMILY MEDICINE | Facility: CLINIC | Age: 67
End: 2017-10-10

## 2017-10-10 NOTE — TELEPHONE ENCOUNTER
Continue symptomatic rx and if not improving OK to see ENT. OK to sign ENT referral if she wants.   Most likely viral and now post inflammatory which improves on its own.

## 2017-11-07 ENCOUNTER — OFFICE VISIT (OUTPATIENT)
Dept: FAMILY MEDICINE | Facility: CLINIC | Age: 67
End: 2017-11-07
Payer: COMMERCIAL

## 2017-11-07 VITALS
HEART RATE: 96 BPM | WEIGHT: 140.25 LBS | SYSTOLIC BLOOD PRESSURE: 102 MMHG | BODY MASS INDEX: 24.85 KG/M2 | OXYGEN SATURATION: 99 % | TEMPERATURE: 98.2 F | RESPIRATION RATE: 12 BRPM | DIASTOLIC BLOOD PRESSURE: 60 MMHG | HEIGHT: 63 IN

## 2017-11-07 DIAGNOSIS — K21.9 GASTROESOPHAGEAL REFLUX DISEASE, ESOPHAGITIS PRESENCE NOT SPECIFIED: ICD-10-CM

## 2017-11-07 DIAGNOSIS — Z12.31 ENCOUNTER FOR SCREENING MAMMOGRAM FOR BREAST CANCER: ICD-10-CM

## 2017-11-07 DIAGNOSIS — R07.89 CHEST DISCOMFORT: Primary | ICD-10-CM

## 2017-11-07 PROCEDURE — 93000 ELECTROCARDIOGRAM COMPLETE: CPT | Performed by: FAMILY MEDICINE

## 2017-11-07 PROCEDURE — 99214 OFFICE O/P EST MOD 30 MIN: CPT | Performed by: FAMILY MEDICINE

## 2017-11-07 NOTE — MR AVS SNAPSHOT
After Visit Summary   11/7/2017    Martha Hayden    MRN: [id number]           Patient Information     Date Of Birth          1950        Visit Information        Provider Department      11/7/2017 10:00 AM Paul Ramos MD [facility]        Today's Diagnoses     Chest discomfort    -  1    Gastroesophageal reflux disease, esophagitis presence not specified        Encounter for screening mammogram for breast cancer          Care Instructions      Tips to Control Acid Reflux    To control acid reflux, you ll need to make some basic diet and lifestyle changes. The simple steps outlined below may be all you ll need to ease discomfort.  Watch what you eat    Avoid fatty foods and spicy foods.    Eat fewer acidic foods, such as citrus and tomato-based foods. These can increase symptoms.    Limit drinking alcohol, caffeine, and fizzy beverages. All increase acid reflux.    Try limiting chocolate, peppermint, and spearmint. These can worsen acid reflux in some people.  Watch when you eat    Avoid lying down for 3 hours after eating.    Do not snack before going to bed.  Raise your head  Raising your head and upper body by 4 to 6 inches helps limit reflux when you re lying down. Put blocks under the head of your bed frame to raise it.  Other changes    Lose weight, if you need to    Don t exercise near bedtime    Avoid tight-fitting clothes    Limit aspirin and ibuprofen    Stop smoking   Date Last Reviewed: 7/1/2016 2000-2017 [employer]. [address] [place] [address]. All rights reserved. This information is not intended as a substitute for professional medical care. Always follow your healthcare professional's instructions.                Follow-ups after your visit        Future tests that were ordered for you today     Open Future Orders        Priority Expected Expires Ordered    *MA Screening Digital Bilateral Routine  11/7/2018 11/7/2017  "           Who to contact     If you have questions or need follow up information about today's clinic visit or your schedule please contact [facility] directly at [phone].  Normal or non-critical lab and imaging results will be communicated to you by MyChart, letter or phone within 4 business days after the clinic has received the results. If you do not hear from us within 7 days, please contact the clinic through MyChart or phone. If you have a critical or abnormal lab result, we will notify you by phone as soon as possible.  Submit refill requests through Me[employer] Mantra or call your pharmacy and they will forward the refill request to us. Please allow 3 business days for your refill to be completed.          Additional Information About Your Visit        FoundValue[employer]LendMeYourLiteracy Information     Me[employer] gives you secure access to your electronic health record. If you see a primary care provider, you can also send messages to your care team and make appointments. If you have questions, please call your primary care clinic.  If you do not have a primary care provider, please call [phone] and they will assist you.        Care EveryWhere ID     This is your Care EveryWhere ID. This could be used by other organizations to access your [place] medical records  [id number]        Your Vitals Were     Pulse Temperature Respirations Height Pulse Oximetry BMI (Body Mass Index)    96 98.2  F (36.8  C) (Oral) 12 5' 3\" (1.6 m) 99% 24.84 kg/m2       Blood Pressure from Last 3 Encounters:   11/07/17 102/60   10/02/17 110/70   06/27/17 108/72    Weight from Last 3 Encounters:   11/07/17 140 lb 4 oz (63.6 kg)   10/02/17 140 lb 4 oz (63.6 kg)   06/27/17 137 lb 12 oz (62.5 kg)              We Performed the Following     EKG 12-lead complete w/read - Clinics        Primary Care Provider Office Phone # Fax #    Paul Ramos [phone] [address] [place] [address]        Equal Access to " Services     [facility]: Hadii aad ku hadbrittneyranjit Renettapadma, wazaidda luqadaha, qaybta kaalmaankita muhammad, aubrie reyes . So [facility] [phone].    ATENCIÓN: Si long seymour, tiene a rodriguez disposición servicios gratuitos de asistencia lingüística. Llame al [phone].    We comply with applicable federal civil rights laws and [place] laws. We do not discriminate on the basis of race, color, national origin, age, disability, sex, sexual orientation, or gender identity.            Thank you!     Thank you for choosing [facility]  for your care. Our goal is always to provide you with excellent care. Hearing back from our patients is one way we can continue to improve our services. Please take a few minutes to complete the written survey that you may receive in the mail after your visit with us. Thank you!             Your Updated Medication List - Protect others around you: Learn how to safely use, store and throw away your medicines at www.disposemymeds.org.          This list is accurate as of: 11/7/17 10:34 AM.  Always use your most recent med list.                   Brand Name Dispense Instructions for use Diagnosis    ascorbic acid 500 MG tablet    VITAMIN C     Take 500 mg by mouth daily.        calcium carbonate 1250 MG tablet    OS-HANS 500 mg [place]. Ca     Take 500 mg by mouth daily        ibuprofen 200 MG tablet    ADVIL/MOTRIN     Take 1-2 tablets by mouth. Every 4-6 hours as needed        * MULTIVITAL PO      Take 1 tablet by mouth daily.        * EYE-VITES PO           propranolol 20 MG tablet    INDERAL    60 tablet    TAKE 1/2 TO 1 TABLET(10 TO 20 MG) BY MOUTH FOUR TIMES DAILY AS NEEDED    Anxiety       VITAMIN D (CHOLECALCIFEROL) PO      Take 4,000 Units by mouth daily        VITAMIN-B COMPLEX PO           ZOLOFT PO      Take 50 mg by mouth daily        * Notice:  This list has 2 medication(s) that are the same as other medications prescribed for you. Read the  directions carefully, and ask your doctor or other care provider to review them with you.

## 2017-11-07 NOTE — PATIENT INSTRUCTIONS
Tips to Control Acid Reflux    To control acid reflux, you ll need to make some basic diet and lifestyle changes. The simple steps outlined below may be all you ll need to ease discomfort.  Watch what you eat    Avoid fatty foods and spicy foods.    Eat fewer acidic foods, such as citrus and tomato-based foods. These can increase symptoms.    Limit drinking alcohol, caffeine, and fizzy beverages. All increase acid reflux.    Try limiting chocolate, peppermint, and spearmint. These can worsen acid reflux in some people.  Watch when you eat    Avoid lying down for 3 hours after eating.    Do not snack before going to bed.  Raise your head  Raising your head and upper body by 4 to 6 inches helps limit reflux when you re lying down. Put blocks under the head of your bed frame to raise it.  Other changes    Lose weight, if you need to    Don t exercise near bedtime    Avoid tight-fitting clothes    Limit aspirin and ibuprofen    Stop smoking   Date Last Reviewed: 7/1/2016 2000-2017 [employer]. [address] [place] [address], [place] [address]. All rights reserved. This information is not intended as a substitute for professional medical care. Always follow your healthcare professional's instructions.
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
## 2017-11-07 NOTE — NURSING NOTE
"Chief Complaint   Patient presents with     Chest Pain       Initial /60 (Cuff Size: Adult Regular)  Pulse 96  Temp 98.2  F (36.8  C) (Oral)  Resp 12  Ht 5' 3\" (1.6 m)  Wt 140 lb 4 oz (63.6 kg)  SpO2 99%  BMI 24.84 kg/m2 Estimated body mass index is 24.84 kg/(m^2) as calculated from the following:    Height as of this encounter: 5' 3\" (1.6 m).    Weight as of this encounter: 140 lb 4 oz (63.6 kg).  Medication Reconciliation: complete     Jania Chester CMA      "

## 2017-11-14 ENCOUNTER — RADIANT APPOINTMENT (OUTPATIENT)
Dept: MAMMOGRAPHY | Facility: CLINIC | Age: 67
End: 2017-11-14
Attending: FAMILY MEDICINE
Payer: MEDICARE

## 2017-11-14 DIAGNOSIS — Z12.31 ENCOUNTER FOR SCREENING MAMMOGRAM FOR BREAST CANCER: ICD-10-CM

## 2017-11-14 PROCEDURE — G0202 SCR MAMMO BI INCL CAD: HCPCS

## 2017-11-26 ENCOUNTER — MYC MEDICAL ADVICE (OUTPATIENT)
Dept: FAMILY MEDICINE | Facility: CLINIC | Age: 67
End: 2017-11-26

## 2017-11-26 DIAGNOSIS — K21.9 GASTROESOPHAGEAL REFLUX DISEASE, ESOPHAGITIS PRESENCE NOT SPECIFIED: Primary | ICD-10-CM

## 2017-11-27 NOTE — TELEPHONE ENCOUNTER
GI consult referral pended.    Dr. Ramos-please review and sign if agree.  Writer planned on informing patient any tests would be determined by GI specialist upon evaluation/consultation.    Thank you!  MISHEL SalmeronN, RN

## 2017-11-28 ENCOUNTER — TRANSFERRED RECORDS (OUTPATIENT)
Dept: HEALTH INFORMATION MANAGEMENT | Facility: CLINIC | Age: 67
End: 2017-11-28

## 2018-01-03 ENCOUNTER — TRANSFERRED RECORDS (OUTPATIENT)
Dept: HEALTH INFORMATION MANAGEMENT | Facility: CLINIC | Age: 68
End: 2018-01-03

## 2018-01-10 ENCOUNTER — TRANSFERRED RECORDS (OUTPATIENT)
Dept: HEALTH INFORMATION MANAGEMENT | Facility: CLINIC | Age: 68
End: 2018-01-10

## 2018-01-16 ENCOUNTER — TRANSFERRED RECORDS (OUTPATIENT)
Dept: HEALTH INFORMATION MANAGEMENT | Facility: CLINIC | Age: 68
End: 2018-01-16

## 2018-01-17 ENCOUNTER — TRANSFERRED RECORDS (OUTPATIENT)
Dept: HEALTH INFORMATION MANAGEMENT | Facility: CLINIC | Age: 68
End: 2018-01-17

## 2018-03-30 ENCOUNTER — OFFICE VISIT (OUTPATIENT)
Dept: FAMILY MEDICINE | Facility: CLINIC | Age: 68
End: 2018-03-30
Payer: COMMERCIAL

## 2018-03-30 VITALS
SYSTOLIC BLOOD PRESSURE: 115 MMHG | TEMPERATURE: 97.6 F | HEIGHT: 63 IN | HEART RATE: 73 BPM | RESPIRATION RATE: 16 BRPM | BODY MASS INDEX: 23.83 KG/M2 | WEIGHT: 134.5 LBS | OXYGEN SATURATION: 99 % | DIASTOLIC BLOOD PRESSURE: 75 MMHG

## 2018-03-30 DIAGNOSIS — K21.9 GASTROESOPHAGEAL REFLUX DISEASE, ESOPHAGITIS PRESENCE NOT SPECIFIED: ICD-10-CM

## 2018-03-30 DIAGNOSIS — Z00.00 ROUTINE GENERAL MEDICAL EXAMINATION AT A HEALTH CARE FACILITY: Primary | ICD-10-CM

## 2018-03-30 DIAGNOSIS — Z23 NEED FOR TDAP VACCINATION: ICD-10-CM

## 2018-03-30 PROCEDURE — 90471 IMMUNIZATION ADMIN: CPT | Performed by: FAMILY MEDICINE

## 2018-03-30 PROCEDURE — 99397 PER PM REEVAL EST PAT 65+ YR: CPT | Mod: 25 | Performed by: FAMILY MEDICINE

## 2018-03-30 PROCEDURE — 90715 TDAP VACCINE 7 YRS/> IM: CPT | Performed by: FAMILY MEDICINE

## 2018-03-30 ASSESSMENT — ANXIETY QUESTIONNAIRES
7. FEELING AFRAID AS IF SOMETHING AWFUL MIGHT HAPPEN: NOT AT ALL
2. NOT BEING ABLE TO STOP OR CONTROL WORRYING: SEVERAL DAYS
6. BECOMING EASILY ANNOYED OR IRRITABLE: NOT AT ALL
3. WORRYING TOO MUCH ABOUT DIFFERENT THINGS: SEVERAL DAYS
GAD7 TOTAL SCORE: 3
1. FEELING NERVOUS, ANXIOUS, OR ON EDGE: SEVERAL DAYS
5. BEING SO RESTLESS THAT IT IS HARD TO SIT STILL: NOT AT ALL
IF YOU CHECKED OFF ANY PROBLEMS ON THIS QUESTIONNAIRE, HOW DIFFICULT HAVE THESE PROBLEMS MADE IT FOR YOU TO DO YOUR WORK, TAKE CARE OF THINGS AT HOME, OR GET ALONG WITH OTHER PEOPLE: SOMEWHAT DIFFICULT

## 2018-03-30 ASSESSMENT — PATIENT HEALTH QUESTIONNAIRE - PHQ9: 5. POOR APPETITE OR OVEREATING: NOT AT ALL

## 2018-03-30 ASSESSMENT — ACTIVITIES OF DAILY LIVING (ADL)
CURRENT_FUNCTION: NO ASSISTANCE NEEDED
I_NEED_ASSISTANCE_FOR_THE_FOLLOWING_DAILY_ACTIVITIES:: NO ASSISTANCE IS NEEDED

## 2018-03-30 NOTE — NURSING NOTE
Screening Questionnaire for Adult Immunization    Are you sick today?   No   Do you have allergies to medications, food, a vaccine component or latex?   No   Have you ever had a serious reaction after receiving a vaccination?   No   Do you have a long-term health problem with heart disease, lung disease, asthma, kidney disease, metabolic disease (e.g. diabetes), anemia, or other blood disorder?   No   Do you have cancer, leukemia, HIV/AIDS, or any other immune system problem?   Yes   In the past 3 months, have you taken medications that affect  your immune system, such as prednisone, other steroids, or anticancer drugs; drugs for the treatment of rheumatoid arthritis, Crohn s disease, or psoriasis; or have you had radiation treatments?   No   Have you had a seizure, or a brain or other nervous system problem?   No   During the past year, have you received a transfusion of blood or blood     products, or been given immune (gamma) globulin or antiviral drug?   No   For women: Are you pregnant or is there a chance you could become        pregnant during the next month?   No   Have you received any vaccinations in the past 4 weeks?   No     Immunization questionnaire was positive for at least one answer.  Notified Dr Ramos.        Per orders of Dr. Ramos, injection of Adacel given by Josefina Massey. Patient instructed to remain in clinic for 15 minutes afterwards, and to report any adverse reaction to me immediately.       Screening performed by Josefina Massey on 3/30/2018 at 3:08 PM.

## 2018-03-30 NOTE — PROGRESS NOTES
SUBJECTIVE:   Martha Hayden is a 67 year old female who presents for Preventive Visit.      Are you in the first 12 months of your Medicare Part B coverage?  No    Answers for HPI/ROS submitted by the patient on 3/30/2018   Annual Exam:  Getting at least 3 servings of Calcium per day:: Yes  Bi-annual eye exam:: Yes  Dental care twice a year:: Yes  Sleep apnea or symptoms of sleep apnea:: None  Diet:: Gluten-free/reduced, Other  Frequency of exercise:: 4-5 days/week  Taking medications regularly:: Yes  Medication side effects:: None  Additional concerns today:: YES  Activities of Daily Living: no assistance needed  Home safety: lack of grab bars in the bathroom  Hearing Impairment:: difficulty following a conversation in a noisy restaurant or crowded room, difficulty understanding speech on the telephone  PHQ-2 Score: 0  Duration of exercise:: 30-45 minutes        Fall risk:none       Fallen 2 or more times in the past year?: No  Any fall with injury in the past year?: No      COGNITIVE SCREEN  1) Repeat 3 items (Banana, Sunrise, Chair)    2) Clock draw:   3) 3 item recall: Recalls 3 objects  Results: NORMAL clock, 1-2 items recalled: COGNITIVE IMPAIRMENT LESS LIKELY    Mini-CogTM Copyright S [employer]. Licensed by the author for use in [facility]; reprinted with permission ([email or web address]@.[email or web address]). All rights reserved.            PROBLEMS TO ADD ON... Dizziness      Duration:   1 month    Description   Feeling faint:  no   Feeling like the surroundings are moving: yes- waving   Loss of consciousness or falls: no     Intensity:  mild    Accompanying signs and symptoms:   Nausea/vomitting: no   Palpitations: no   Weakness in arms or legs: no   Vision or speech changes: YES- vision on left side  Ringing in ears (Tinnitus): YES-   Hearing loss related to dizziness: no   Other (fevers/chills/sweating/dyspnea): no     History (similar episodes/head trauma/previous evaluation/recent bleeding): yes-  vertigo    Precipitating or alleviating factors (new meds/chemicals): omeprazole   Worse with activity/head movement: no     Therapies tried and outcome: None       Reviewed and updated as needed this visit by clinical staff  Allergies       Reviewed and updated as needed this visit by Provider    Social History   Substance Use Topics     Smoking status: Former Smoker     Types: Cigarettes     Start date: 9/10/1966     Quit date: 1/28/1995     Smokeless tobacco: Never Used      Comment: intermittent smoker, no specific pattern     Alcohol use No      Comment: Recovering alcoholic for 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 No    Today's PHQ-2 Score:   PHQ-2 ( 1999 [employer]) 3/30/2018 3/30/2018   Q1: Little interest or pleasure in doing things 0 0   Q2: Feeling down, depressed or hopeless 0 0   PHQ-2 Score 0 0   Q1: Little interest or pleasure in doing things Not at all -   Q2: Feeling down, depressed or hopeless Not at all -   PHQ-2 Score 0 -     Do you feel safe in your environment - Yes    Do you have a Health Care Directive?: Yes: Patient states has Advance Directive and will bring in a copy to clinic.    Current providers sharing in care for this patient include:   Patient Care Team:  Paul Ramos MD as PCP - General (Family Practice)  Ezekiel Irene DO as MD (Neurology)  Willi Maza, RN as Registered Nurse (Neurology)    The following health maintenance items are reviewed in Epic and correct as of today:  Health Maintenance   Topic Date Due     ADVANCE DIRECTIVE PLANNING Q5 YRS  06/22/2017     PHQ-9 Q6 MONTHS  12/27/2017     TETANUS IMMUNIZATION (SYSTEM ASSIGNED)  02/11/2018     FALL RISK ASSESSMENT  02/17/2018     DEPRESSION ACTION PLAN Q1 YR  03/10/2018     INFLUENZA VACCINE (SYSTEM ASSIGNED)  09/01/2018     MAMMO SCREEN Q2 YR (SYSTEM ASSIGNED)  11/14/2019     LIPID SCREEN Q5 YR FEMALE (SYSTEM ASSIGNED)  03/10/2022     COLON  "CANCER SCREEN (SYSTEM ASSIGNED)  11/16/2025     DEXA SCAN SCREENING (SYSTEM ASSIGNED)  Completed     PNEUMOCOCCAL  Completed     HEPATITIS C SCREENING  Completed     Labs reviewed in EPIC     Some thumb pain. Will call for PT referral if needed. Right now would like to wait.     Been to GI. On omeprazole. Improving. Now taking daily rather than BID.    Still several time per week dizziness. Doing home exercises . Does not feel like she needs more help for now.    ROS:  Constitutional, HEENT, cardiovascular, pulmonary, GI, , musculoskeletal, neuro, skin, endocrine and psych systems are negative, except as otherwise noted.    OBJECTIVE:   /75 (BP Location: Left arm, Patient Position: Chair, Cuff Size: Adult Regular)  Pulse 73  Temp 97.6  F (36.4  C) (Oral)  Resp 16  Ht 5' 2.75\" (1.594 m)  Wt 134 lb 8 oz (61 kg)  SpO2 99%  BMI 24.02 kg/m2 Estimated body mass index is 24.02 kg/(m^2) as calculated from the following:    Height as of this encounter: 5' 2.75\" (1.594 m).    Weight as of this encounter: 134 lb 8 oz (61 kg).  EXAM:   GENERAL APPEARANCE: healthy, alert and no distress  EYES: Eyes grossly normal to inspection, PERRL and conjunctivae and sclerae normal  HENT: ear canals and TM's normal, nose and mouth without ulcers or lesions, oropharynx clear and oral mucous membranes moist  NECK: no adenopathy, no asymmetry, masses, or scars and thyroid normal to palpation  RESP: lungs clear to auscultation - no rales, rhonchi or wheezes  BREAST: : normal without suspicious masses, skin changes or axillary nodes,    CV: regular rate and rhythm, normal S1 S2, no S3 or S4, no murmur, click or rub, no peripheral edema and peripheral pulses strong  ABDOMEN: soft, nontender, no hepatosplenomegaly, no masses and bowel sounds normal  MS: no musculoskeletal defects are noted and gait is age appropriate without ataxia  SKIN: no suspicious lesions or rashes  NEURO: Normal strength and tone, sensory exam grossly normal, " "mentation intact and speech normal  PSYCH: mentation appears normal and affect normal/bright  Examination chaperoned by Melissa Jiménez RN.  ASSESSMENT / PLAN:   1. Routine general medical examination at a health care facility       2. Need for Tdap vaccination     - TDAP VACCINE (ADACEL)    3. Gastroesophageal reflux disease, esophagitis presence not specified  Discussed slow taper of PPI over time. Reviewed recent GI notes.       End of Life Planning:  Patient currently has an advanced directive: No.  I have verified the patient's ablity to prepare an advanced directive/make health care decisions.  Literature was provided to assist patient in preparing an advanced directive.    COUNSELING:  Reviewed preventive health counseling, as reflected in patient instructions  Special attention given to:       Regular exercise       Healthy diet/nutrition       Vision screening       Hearing screening       Dental care       Osteoporosis Prevention/Bone Health       Colon cancer screening        Estimated body mass index is 24.02 kg/(m^2) as calculated from the following:    Height as of this encounter: 5' 2.75\" (1.594 m).    Weight as of this encounter: 134 lb 8 oz (61 kg).       reports that she quit smoking about 23 years ago. Her smoking use included Cigarettes. She started smoking about 51 years ago. She has never used smokeless tobacco.      Appropriate preventive services were discussed with this patient, including applicable screening as appropriate for cardiovascular disease, diabetes, osteopenia/osteoporosis, and glaucoma.  As appropriate for age/gender, discussed screening for colorectal cancer, prostate cancer, breast cancer, and cervical cancer. Checklist reviewing preventive services available has been given to the patient.    Reviewed patients plan of care and provided an AVS. The Basic Care Plan (routine screening as documented in Health Maintenance) for Martha meets the Care Plan requirement. This Care Plan " has been established and reviewed with the Patient.    Counseling Resources:  ATP IV Guidelines  Pooled Cohorts Equation Calculator  Breast Cancer Risk Calculator  FRAX Risk Assessment  ICSI Preventive Guidelines  Dietary Guidelines for Americans, 2010  USDA's MyPlate  ASA Prophylaxis  Lung CA Screening    Paul Ramos MD, MD  [facility]

## 2018-03-30 NOTE — PATIENT INSTRUCTIONS

## 2018-03-30 NOTE — LETTER
My Depression Action Plan  Name: Martha Hayden   Date of Birth 1950  Date: 3/30/2018    My doctor: Paul Ramos   My clinic: [address] [address] [phone]          GREEN    ZONE   Good Control    What it looks like:     Things are going generally well. You have normal up s and down s. You may even feel depressed from time to time, but bad moods usually last less than a day.   What you need to do:  1. Continue to care for yourself (see self care plan)  2. Check your depression survival kit and update it as needed  3. Follow your physician s recommendations including any medication.  4. Do not stop taking medication unless you consult with your physician first.           YELLOW         ZONE Getting Worse    What it looks like:     Depression is starting to interfere with your life.     It may be hard to get out of bed; you may be starting to isolate yourself from others.    Symptoms of depression are starting to last most all day and this has happened for several days.     You may have suicidal thoughts but they are not constant.   What you need to do:     1. Call your care team, your response to treatment will improve if you keep your care team informed of your progress. Yellow periods are signs an adjustment may need to be made.     2. Continue your self-care, even if you have to fake it!    3. Talk to someone in your support network    4. Open up your depression survival kit           RED    ZONE Medical Alert - Get Help    What it looks like:     Depression is seriously interfering with your life.     You may experience these or other symptoms: You can t get out of bed most days, can t work or engage in other necessary activities, you have trouble taking care of basic hygiene, or basic responsibilities, thoughts of suicide or death that will not go away, self-injurious behavior.     What you need to do:  1. Call your  care team and request a same-day appointment. If they are not available (weekends or after hours) call your local crisis line, emergency room or 911.            Depression Self Care Plan / Survival Kit    Self-Care for Depression  Here s the deal. Your body and mind are really not as separate as most people think.  What you do and think affects how you feel and how you feel influences what you do and think. This means if you do things that people who feel good do, it will help you feel better.  Sometimes this is all it takes.  There is also a place for medication and therapy depending on how severe your depression is, so be sure to consult with your medical provider and/ or Behavioral Health Consultant if your symptoms are worsening or not improving.     In order to better manage my stress, I will:    Exercise  Get some form of exercise, every day. This will help reduce pain and release endorphins, the  feel good  chemicals in your brain. This is almost as good as taking antidepressants!  This is not the same as joining a gym and then never going! (they count on that by the way ) It can be as simple as just going for a walk or doing some gardening, anything that will get you moving.      Hygiene   Maintain good hygiene (Get out of bed in the morning, Make your bed, Brush your teeth, Take a shower, and Get dressed like you were going to work, even if you are unemployed).  If your clothes don't fit try to get ones that do.    Diet  I will strive to eat foods that are good for me, drink plenty of water, and avoid excessive sugar, caffeine, alcohol, and other mood-altering substances.  Some foods that are helpful in depression are: complex carbohydrates, B vitamins, flaxseed, fish or fish oil, fresh fruits and vegetables.    Psychotherapy  I agree to participate in Individual Therapy (if recommended).    Medication  If prescribed medications, I agree to take them.  Missing doses can result in serious side effects.  I  understand that drinking alcohol, or other illicit drug use, may cause potential side effects.  I will not stop my medication abruptly without first discussing it with my provider.    Staying Connected With Others  I will stay in touch with my friends, family members, and my primary care provider/team.    Use your imagination  Be creative.  We all have a creative side; it doesn t matter if it s oil painting, sand castles, or mud pies! This will also kick up the endorphins.    Witness Beauty  (AKA stop and smell the roses) Take a look outside, even in mid-winter. Notice colors, textures. Watch the squirrels and birds.     Service to others  Be of service to others.  There is always someone else in need.  By helping others we can  get out of ourselves  and remember the really important things.  This also provides opportunities for practicing all the other parts of the program.    Humor  Laugh and be silly!  Adjust your TV habits for less news and crime-drama and more comedy.    Control your stress  Try breathing deep, massage therapy, biofeedback, and meditation. Find time to relax each day.     My support system    Clinic Contact:  Phone number:    Contact 1:  Phone number:    Contact 2:  Phone number:    Protestant/:  Phone number:    Therapist:  Phone number:    Local crisis center:    Phone number:    Other community support:  Phone number:

## 2018-03-30 NOTE — MR AVS SNAPSHOT
After Visit Summary   3/30/2018    Martha Hayden    MRN: [id number]           Patient Information     Date Of Birth          1950        Visit Information        Provider Department      3/30/2018 2:00 PM Paul Ramos MD [facility]        Today's Diagnoses     Routine general medical examination at a health care facility    -  1    Need for Tdap vaccination        Gastroesophageal reflux disease, esophagitis presence not specified          Care Instructions      Preventive Health Recommendations    Female Ages 65 +    Yearly exam:     See your health care provider every year in order to  o Review health changes.   o Discuss preventive care.    o Review your medicines if your doctor has prescribed any.      You no longer need a yearly Pap test unless you've had an abnormal Pap test in the past 10 years. If you have vaginal symptoms, such as bleeding or discharge, be sure to talk with your provider about a Pap test.      Every 1 to 2 years, have a mammogram.  If you are over 69, talk with your health care provider about whether or not you want to continue having screening mammograms.      Every 10 years, have a colonoscopy. Or, have a yearly FIT test (stool test). These exams will check for colon cancer.       Have a cholesterol test every 5 years, or more often if your doctor advises it.       Have a diabetes test (fasting glucose) every three years. If you are at risk for diabetes, you should have this test more often.       At age 65, have a bone density scan (DEXA) to check for osteoporosis (brittle bone disease).    Shots:    Get a flu shot each year.    Get a tetanus shot every 10 years.    Talk to your doctor about your pneumonia vaccines. There are now two you should receive - Pneumovax (PPSV 23) and Prevnar (PCV 13).    Talk to your doctor about the shingles vaccine. - shingrix.     Talk to your doctor about the hepatitis B vaccine.    Nutrition:     Eat at  least 5 servings of fruits and vegetables each day.      Eat whole-grain bread, whole-wheat pasta and brown rice instead of white grains and rice.      Talk to your provider about Calcium and Vitamin D.     Lifestyle    Exercise at least 150 minutes a week (30 minutes a day, 5 days a week). This will help you control your weight and prevent disease.      Limit alcohol to one drink per day.      No smoking.       Wear sunscreen to prevent skin cancer.       See your dentist twice a year for an exam and cleaning.      See your eye doctor every 1 to 2 years to screen for conditions such as glaucoma, macular degeneration and cataracts.    Slowly taper off from omeprazole.  Start with 1 day per week - day you do not take omeprazole - take ranitidine twice per day.  Next week 2 days per week  And like that ....          Follow-ups after your visit        Who to contact     If you have questions or need follow up information about today's clinic visit or your schedule please contact [facility] directly at [phone].  Normal or non-critical lab and imaging results will be communicated to you by [employer], letter or phone within 4 business days after the clinic has received the results. If you do not hear from us within 7 days, please contact the clinic through [employer] or phone. If you have a critical or abnormal lab result, we will notify you by phone as soon as possible.  Submit refill requests through [employer] or call your pharmacy and they will forward the refill request to us. Please allow 3 business days for your refill to be completed.          Additional Information About Your Visit        [employer] Information     [employer] gives you secure access to your electronic health record. If you see a primary care provider, you can also send messages to your care team and make appointments. If you have questions, please call your primary care clinic.  If you do not have a primary care provider, please call  "[phone] and they will assist you.        Care EveryWhere ID     This is your Care EveryWhere ID. This could be used by other organizations to access your [place] medical records  [id number]        Your Vitals Were     Pulse Temperature Respirations Height Pulse Oximetry BMI (Body Mass Index)    73 97.6  F (36.4  C) (Oral) 16 5' 2.75\" (1.594 m) 99% 24.02 kg/m2       Blood Pressure from Last 3 Encounters:   03/30/18 115/75   11/07/17 102/60   10/02/17 110/70    Weight from Last 3 Encounters:   03/30/18 134 lb 8 oz (61 kg)   11/07/17 140 lb 4 oz (63.6 kg)   10/02/17 140 lb 4 oz (63.6 kg)              We Performed the Following     DEPRESSION ACTION PLAN (DAP)     TDAP VACCINE (ADACEL)        Primary Care Provider Office Phone # Fax #    Paul Sabina Ramos [phone]       [facility] [address] [place] [address]        Equal Access to Services     [facility]: Hadii derek moulton hadasho Sopadma, waaxda luqadaha, qaybta kaalmada jb, aubrie reyes . So [facility] [phone].    ATENCIÓN: Si habla español, tiene a rodriguez disposición servicios gratuitos de asistencia lingüística. [facility] [phone].    We comply with applicable federal civil rights laws and [place] laws. We do not discriminate on the basis of race, color, national origin, age, disability, sex, sexual orientation, or gender identity.            Thank you!     Thank you for choosing [facility]  for your care. Our goal is always to provide you with excellent care. Hearing back from our patients is one way we can continue to improve our services. Please take a few minutes to complete the written survey that you may receive in the mail after your visit with us. Thank you!             Your Updated Medication List - Protect others around you: Learn how to safely use, store and throw away your medicines at www.disposemymeds.org.          This list is accurate as of 3/30/18  2:50 PM.  Always " use your most recent med list.                   Brand Name Dispense Instructions for use Diagnosis    ascorbic acid 500 MG tablet    VITAMIN C     Take 500 mg by mouth daily.        calcium carbonate 1250 MG tablet    OS-HANS 500 mg [place]. Ca     Take 500 mg by mouth daily        ibuprofen 200 MG tablet    ADVIL/MOTRIN     Take 1-2 tablets by mouth. Every 4-6 hours as needed        * MULTIVITAL PO      Take 1 tablet by mouth daily.        * EYE-VITES PO           * EYE VITAMINS PO           OMEPRAZOLE PO      Take 20 mg by mouth        propranolol 20 MG tablet    INDERAL    60 tablet    TAKE 1/2 TO 1 TABLET(10 TO 20 MG) BY MOUTH FOUR TIMES DAILY AS NEEDED    Anxiety       VITAMIN D (CHOLECALCIFEROL) PO      Take 4,000 Units by mouth daily        VITAMIN-B COMPLEX PO           ZOLOFT PO      Take 50 mg by mouth daily        * Notice:  This list has 3 medication(s) that are the same as other medications prescribed for you. Read the directions carefully, and ask your doctor or other care provider to review them with you.

## 2018-03-31 ASSESSMENT — ANXIETY QUESTIONNAIRES: GAD7 TOTAL SCORE: 3

## 2018-03-31 ASSESSMENT — PATIENT HEALTH QUESTIONNAIRE - PHQ9: SUM OF ALL RESPONSES TO PHQ QUESTIONS 1-9: 1

## 2018-04-29 ENCOUNTER — NURSE TRIAGE (OUTPATIENT)
Dept: NURSING | Facility: CLINIC | Age: 68
End: 2018-04-29

## 2018-04-30 ENCOUNTER — OFFICE VISIT (OUTPATIENT)
Dept: FAMILY MEDICINE | Facility: CLINIC | Age: 68
End: 2018-04-30
Payer: COMMERCIAL

## 2018-04-30 VITALS
HEART RATE: 98 BPM | HEIGHT: 63 IN | WEIGHT: 134.5 LBS | BODY MASS INDEX: 23.83 KG/M2 | DIASTOLIC BLOOD PRESSURE: 71 MMHG | SYSTOLIC BLOOD PRESSURE: 107 MMHG | TEMPERATURE: 99.1 F | OXYGEN SATURATION: 99 %

## 2018-04-30 DIAGNOSIS — K52.9 GASTROENTERITIS: Primary | ICD-10-CM

## 2018-04-30 PROCEDURE — 99213 OFFICE O/P EST LOW 20 MIN: CPT | Performed by: FAMILY MEDICINE
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
## 2018-04-30 NOTE — MR AVS SNAPSHOT
"              After Visit Summary   4/30/2018    Martha Hayden    MRN: [id number]           Patient Information     Date Of Birth          1950        Visit Information        Provider Department      4/30/2018 11:40 AM Paul Ramos MD [facility]        Today's Diagnoses     Gastroenteritis    -  1       Follow-ups after your visit        Who to contact     If you have questions or need follow up information about today's clinic visit or your schedule please contact [facility] directly at [phone].  Normal or non-critical lab and imaging results will be communicated to you by [employer], letter or phone within 4 business days after the clinic has received the results. If you do not hear from us within 7 days, please contact the clinic through [employer] or phone. If you have a critical or abnormal lab result, we will notify you by phone as soon as possible.  Submit refill requests through [employer] or call your pharmacy and they will forward the refill request to us. Please allow 3 business days for your refill to be completed.          Additional Information About Your Visit        MyChart Information     [employer] gives you secure access to your electronic health record. If you see a primary care provider, you can also send messages to your care team and make appointments. If you have questions, please call your primary care clinic.  If you do not have a primary care provider, please call [phone] and they will assist you.        Care EveryWhere ID     This is your Care EveryWhere ID. This could be used by other organizations to access your [place] medical records  [id number]        Your Vitals Were     Pulse Temperature Height Pulse Oximetry BMI (Body Mass Index)       98 99.1  F (37.3  C) (Oral) 5' 3\" (1.6 m) 99% 23.83 kg/m2        Blood Pressure from Last 3 Encounters:   04/30/18 107/71   03/30/18 115/75   11/07/17 102/60    Weight from Last 3 Encounters: "   04/30/18 134 lb 8 oz (61 kg)   03/30/18 134 lb 8 oz (61 kg)   11/07/17 140 lb 4 oz (63.6 kg)              Today, you had the following     No orders found for display       Primary Care Provider Office Phone # Fax #    Paul Sabina Ramos [phone] [address] [place] [id number]        Equal Access to Services     LIVE FREITAS : Hadii aad ku hadasho Soomaali, waaxda luqadaha, qaybta kaalmada adeegyada, waxay tyein haynormann marysol saucedofélixarnold reyes . So [facility] [phone].    ATENCIÓN: Si long seymour, tiene a rodriguez disposición servicios gratuitos de asistencia lingüística. Shahnazame al [phone].    We comply with applicable federal civil rights laws and [place] laws. We do not discriminate on the basis of race, color, national origin, age, disability, sex, sexual orientation, or gender identity.            Thank you!     Thank you for choosing [facility]  for your care. Our goal is always to provide you with excellent care. Hearing back from our patients is one way we can continue to improve our services. Please take a few minutes to complete the written survey that you may receive in the mail after your visit with us. Thank you!             Your Updated Medication List - Protect others around you: Learn how to safely use, store and throw away your medicines at www.disposemymeds.org.          This list is accurate as of 4/30/18  2:46 PM.  Always use your most recent med list.                   Brand Name Dispense Instructions for use Diagnosis    ascorbic acid 500 MG tablet    VITAMIN C     Take 500 mg by mouth daily.        calcium carbonate 500 tablet    OS-HANS 500 mg [place]. Ca     Take 500 mg by mouth daily        * MULTIVITAL PO      Take 1 tablet by mouth daily.        * EYE VITAMINS PO           OMEPRAZOLE PO      Take 20 mg by mouth        propranolol 20 MG tablet    INDERAL    60 tablet    TAKE 1/2 TO 1 TABLET(10 TO 20 MG) BY MOUTH FOUR TIMES DAILY AS NEEDED     Anxiety       VITAMIN D (CHOLECALCIFEROL) PO      Take 4,000 Units by mouth daily        VITAMIN-B COMPLEX PO           * Notice:  This list has 2 medication(s) that are the same as other medications prescribed for you. Read the directions carefully, and ask your doctor or other care provider to review them with you.

## 2018-04-30 NOTE — TELEPHONE ENCOUNTER
Reason for Disposition    Nausea is a chronic symptom (recurrent or ongoing AND present > 4 weeks)    Additional Information    Negative: Shock suspected (e.g., cold/pale/clammy skin, too weak to stand, low BP, rapid pulse)    Negative: Sounds like a life-threatening emergency to the triager    Negative: [1] Nausea or vomiting AND [2] pregnancy < 20 weeks    Negative: Menstrual Period - Missed or Late (i.e., pregnancy suspected)    Negative: Heat exhaustion suspected (i.e., dehydration from heat exposure)    Negative: Motion sickness suspected (i.e., nausea with car, plane, boat, or train travel)    Negative: Anxiety or stress suspected (i.e., nausea with anxiety attacks or stressful situations)    Negative: Traumatic Brain Injury (TBI) suspected    Negative: Vomiting occurs    Negative: Other symptom is present, see that guideline.  (e.g., chest pain, headache, dizziness, abdominal pain, colds, sore throat, etc.).    Negative: Unable to walk, or can only walk with assistance (e.g., requires support)    Negative: Difficulty breathing    Negative: [1] Insulin-dependent diabetes (Type I) AND [2] glucose > 400 mg/dl (22 mmol/l)    Negative: [1] Drinking very little AND [2] dehydration suspected (e.g., no urine > 12 hours, very dry mouth, very lightheaded)    Negative: Patient sounds very sick or weak to the triager    Negative: Fever > 104 F (40 C)    Negative: [1] Fever > 101 F (38.3 C) AND [2] age > 60    Negative: [1] Fever > 101 F (38.3 C) AND [2] bedridden (e.g., nursing home patient, CVA, chronic illness, recovering from surgery)    Negative: [1] Fever > 100.5 F (38.1 C) AND [2] diabetes mellitus or weak immune system (e.g., HIV positive, cancer chemo, splenectomy, organ transplant, chronic steroids)    Negative: Taking any of the following medications: digoxin (Lanoxin), lithium, theophylline, phenytoin (Dilantin)    Negative: Yellowish color of the skin or white of the eye (i.e., jaundice)    Negative: Fever  present > 3 days (72 hours)    Negative: Receiving cancer chemotherapy medication    Negative: Taking prescription medication that could cause nausea (e.g., narcotics/opiates, antibiotics, OCPs, many others)    Negative: Nausea persists > 1 week    Negative: Alcohol or drug abuse, known or suspected    Protocols used: NAUSEA-ADULT-AH  Caller states she is having nausea and vomiting and is not able to keep anything down. Caller denies fever, no diarrhea noted. Triage guidelines recommend to see provider within 2 weeks. Caller states she wants to go to an urgent care, because she can't eat or drink anything. Triager advised caller that [facility] urgent cares are closed, and that she can only see a provider tonight in the ED. Caller verbalized and understands directives.

## 2018-08-04 ENCOUNTER — MYC MEDICAL ADVICE (OUTPATIENT)
Dept: FAMILY MEDICINE | Facility: CLINIC | Age: 68
End: 2018-08-04

## 2018-08-04 DIAGNOSIS — M79.643 PAIN OF HAND, UNSPECIFIED LATERALITY: Primary | ICD-10-CM

## 2018-08-07 ENCOUNTER — THERAPY VISIT (OUTPATIENT)
Dept: OCCUPATIONAL THERAPY | Facility: CLINIC | Age: 68
End: 2018-08-07
Payer: COMMERCIAL

## 2018-08-07 DIAGNOSIS — M79.641 PAIN OF RIGHT HAND: Primary | ICD-10-CM

## 2018-08-07 PROCEDURE — 97110 THERAPEUTIC EXERCISES: CPT | Mod: GO | Performed by: OCCUPATIONAL THERAPIST

## 2018-08-07 PROCEDURE — 97140 MANUAL THERAPY 1/> REGIONS: CPT | Mod: GO | Performed by: OCCUPATIONAL THERAPIST

## 2018-08-07 PROCEDURE — 97165 OT EVAL LOW COMPLEX 30 MIN: CPT | Mod: GO | Performed by: OCCUPATIONAL THERAPIST

## 2018-08-07 PROCEDURE — 97760 ORTHOTIC MGMT&TRAING 1ST ENC: CPT | Mod: GO | Performed by: OCCUPATIONAL THERAPIST

## 2018-08-07 NOTE — PROGRESS NOTES
Hand Therapy Initial Evaluation    Current Date:  8/7/2018    Diagnosis: Right hand pain  Onset: years ago, worsened ~1 week ago  MD order: 8/4/18           Subjective  Martha Hayden is a 68 year old Right hand dominant female.    Patient reports symptoms of pain of the right thumb and wrist which occurred due to unknown etiology-increased use of hand for a week for a workshop. Since onset symptoms are Unchanged, pain comes and goes.  Special tests:  none.  Previous treatment: none.    General health as reported by patient is excellent.  Pertinent medical history includes:Chemical Dependency, Depression, Multiple Sclerosis, Numbness/Tingling, Sleep Disorder/Apnea, h/o Smoking.  Medical allergies:none.  Surgical history: cancer: insitu melanoma.  Medication history: None.    Occupational Profile Information:  Patient is retired from work  Prior functional level:  no limitations  Barriers include:none  Mobility: No difficulty  Transportation: drives  Leisure activities/hobbies: walking, biking, art studio    Identified Performance Deficits:  home establishment and management and meal preparation and cleanup.  Patient not currently having pain, so currently no limitations  Red flags:  progressive neurological deficits-MS    Functional Outcome Measure:  Upper Extremity Functional Index Score:  SCORE:   Column Totals: 78/80  (A lower score indicates greater disability.)      Objective  Pain: Patient currently not having pain.  When there is pain it is located at the thumb, base of thumb and radial wrist.  Pain at worst is 4-5/10 mainly with use/activity.    ROM:  Thumb  8/7/2018   AROM(PROM) Left Right   MP  71 65    IP  69  73   RAbd  41  39   PAbd  40  34   Kapandji Opposition Scale (0-10/10)  10  10     Observation/Appearance:  Key: + = present: - = not observed    8/7/2018   Shoulder deformity present over CMC R:+  L:+   Volar subluxation present R:+  L:-   Edema over the CMC joint R:+  L:+   Noted collapse of  MP into hyperextension during pinch R:mild/mod  L:-   Tenderness at CMC R:+  L:-    enlarged MP joints of B thumbs and well as B IF    Provocative Tests:  Pain Report:  - none    + mild    ++ moderate    +++ severe     8/7/2018   CMC Grind test R:-  L:-   CMC Joint line/pain R:+/++  L:-   CMC AP joint laxity R:-, mild lax  L: -, mild lax   Finkelstein's R:+  L:-   Crepitus present R:neg  L:neg      Palpation:  Tightness noted B webspace    Strength: (Measured in pounds, pain scale 0-10/10)    Date 8/7/2018     Trials Left Right Left Right Left Right   1 67 47       2         3         Avg         Pain  2         3 Point Pinch  Date 8/7/2018     Trials Left Right Left Right Left Right   1 9 11       2         3         Avg         Pain  0         Lateral Pinch  Date 8/7/2018     Trials Left Right Left Right Left Right   1 10 11       2         3         Avg         Pain  2         Assessment  Patient presents with symptoms consistent with diagnosis of R hand pain, with conservative intervention.    Patient s limitations or Problem List includes: Pain, Decreased ROM/motion, muscle tightness, weakness, decreased stability of the CMC joint, decreased  and pinch strength of the thumb which interferes with patients ability to perform  Recreational Activities and Household Chores as compared to previous level of function.    Rehab Potential: Good- Return to full activity, some limitations.    Patient will benefit from skilled Occupational Therapy to increased ROM, flexibility, pinch strength, and stability of the thumb and decrease pain and muscle tightness to return to previous activity level and resume normal daily tasks and to reach their rehab potential.      Chart Review: Brief history including review of medical and/or therapy records relating to the presenting problem  Assessment of Occupational Performance:  1-3 Performance Deficits  Identified Performance Deficits: home establishment and management, meal  prep and leisure activities    Clinical Decision Making (Complexity): Low complexity    Barriers to Learning:  No barrier  Communication Issues: Patient appears to be able to clearly communicate and understand verbal and written communication and follow directions correctly.    Treatment Explanations:  The following has been discussed with the patient,  Rx ordered/plan of care  Anticipated outcomes  Possible risks and side effects    Plan  Frequency:  1 X week, once daily  Duration:  for 4 weeks    Treatment Plan:  Modalities:  Paraffin  Therapeutic Exercise: AROM, PROM, Isometrics, and Stabilization   Manual Techniques: Joint Mobilization, MFR  Orthosis fabrication:  Hand based Thumb Spica, Custom neoprene support   Self-care:  Self care tasks, ergonomics, Education, Anatomy of CMC, joint protection principles, adaptive equipment as needed    Discharge Plan:  Achieve all LTG  [place] in home treatment program.  Reach maximal therapeutic benefit.    Home Program:  Warmth/ice  1st web release with clip  Thumb Stabilization Program with hard  C  and index abd  Hand based Thumb Spica orthosis night/sleeping and per symptoms during the day   Incorporate joint protection into daily functional activities    Next Visit:  MFR  Progress stab program  AE

## 2018-08-07 NOTE — MR AVS SNAPSHOT
After Visit Summary   8/7/2018    Martha Hayden    MRN: [id number]           Patient Information     Date Of Birth          1950        Visit Information        Provider Department      8/7/2018 1:30 PM Alfredo Campos OT [facility]        Today's Diagnoses     Pain of right hand    -  1       Follow-ups after your visit        Your next 10 appointments already scheduled     Aug 14, 2018 12:00 PM CDT   LAURO Hand with Yesenia Aranda    [facility] ([facility] [place])    [address]  [address]th [facility] [address] [phone]              Who to contact     If you have questions or need follow up information about today's clinic visit or your schedule please contact [facility] directly at [phone].  Normal or non-critical lab and imaging results will be communicated to you by MyChart, letter or phone within 4 business days after the clinic has received the results. If you do not hear from us within 7 days, please contact the clinic through MyChart or phone. If you have a critical or abnormal lab result, we will notify you by phone as soon as possible.  Submit refill requests through [employer] or call your pharmacy and they will forward the refill request to us. Please allow 3 business days for your refill to be completed.          Additional Information About Your Visit        MyChart Information     [employer] gives you secure access to your electronic health record. If you see a primary care provider, you can also send messages to your care team and make appointments. If you have questions, please call your primary care clinic.  If you do not have a primary care provider, please call [phone] and they will assist you.        Care EveryWhere ID     This is your Care EveryWhere ID. This could be used by other organizations to access your [place] medical records  [id number]         Blood Pressure from Last 3 Encounters:  Infectious Diseases - Attending at Dr. Armenta    HPI:  56 yo female with PMH: HTN admitted with c/o sore throat that started yesterday.  Pt stated that the pain started abruptly during the day and got progressively worse despite taking codeine.  Near the end of the night pt stated that it hurt so much that she couldn't swallow anything.  She did c/o of mild cough but non-productive, more of just salivary secretions.  Pt called 911 when she felt her throat closing up and felt like she was gasping for air.  On arrival to ER pt noted to have "hot potato" voice and was given a dose of decadron and toradol with pt having immediate relief of her symptoms stating that she wasn't having any more significant pain and does not feeling of throat closing.  Neck XR in ER suspicious for epiglottitis (23 Jan 2018 16:02)      PAST MEDICAL & SURGICAL HISTORY:  HTN (hypertension)      Allergies    No Known Allergies    Intolerances        FAMILY HISTORY:      SOCIAL HISTORY:    REVIEW OF SYSTEMS:    Constitutional: No fever, weight loss or fatigue  Eyes: No eye pain, visual disturbances, or discharge  ENT:  No difficulty hearing, tinnitus, vertigo; No sinus or throat pain  Neck: No pain or stiffness  Respiratory: No cough, wheezing, chills or hemoptysis  Cardiovascular: No chest pain, palpitations, shortness of breath, dizziness or leg swelling  Gastrointestinal: No abdominal or epigastric pain. No nausea, vomiting or hematemesis; No diarrhea or constipation. No melena or hematochezia.  Genitourinary: No dysuria, frequency, hematuria or incontinence  Neurological: No headaches, memory loss, loss of strength, numbness or tremors  Skin: No itching, burning, rashes or lesions       MEDICATIONS  (STANDING):  ALBUTerol    90 MICROgram(s) HFA Inhaler 1 Puff(s) Inhalation every 4 hours  ALBUTerol/ipratropium for Nebulization 3 milliLiter(s) Nebulizer every 6 hours  cefTRIAXone   IVPB 1 Gram(s) IV Intermittent every 24 hours  cefTRIAXone   IVPB      enoxaparin Injectable 40 milliGRAM(s) SubCutaneous daily  lactated ringers. 1000 milliLiter(s) (50 mL/Hr) IV Continuous <Continuous>  pantoprazole  Injectable 40 milliGRAM(s) IV Push daily  tiotropium 18 MICROgram(s) Capsule 1 Capsule(s) Inhalation daily  vancomycin  IVPB 1000 milliGRAM(s) IV Intermittent every 8 hours    MEDICATIONS  (PRN):  benzocaine 15 mG/menthol 3.6 mG Lozenge 1 Lozenge Oral every 4 hours PRN Sore Throat      Vital Signs Last 24 Hrs  T(C): 36.6 (24 Jan 2018 16:46), Max: 36.6 (24 Jan 2018 11:53)  T(F): 97.9 (24 Jan 2018 16:46), Max: 97.9 (24 Jan 2018 11:53)  HR: 77 (24 Jan 2018 17:02) (68 - 97)  BP: 123/78 (24 Jan 2018 16:46) (123/78 - 150/85)  BP(mean): 101 (24 Jan 2018 00:00) (89 - 101)  RR: 17 (24 Jan 2018 16:46) (17 - 22)  SpO2: 97% (24 Jan 2018 17:02) (96% - 99%)    PHYSICAL EXAM:    Constitutional: NAD, well-groomed, well-developed  HEENT: PERRLA, EOMI, Normal Hearing, MMM  Neck: No LAD, No JVD  Back: Normal spine flexure, No CVA tenderness  Respiratory: CTAB/L  Cardiovascular: S1 and S2, RRR, no M/G/R  Gastrointestinal: BS+, soft, NT/ND  Extremities: No peripheral edema  Vascular: 2+ peripheral pulses  Neurological: A/O x 3, no focal deficits  Skin: No rashes      LABS:                        13.0   9.42  )-----------( 306      ( 24 Jan 2018 10:14 )             37.9     01-24    143  |  105  |  15  ----------------------------<  156<H>  4.0   |  31  |  0.94    Ca    9.0      24 Jan 2018 10:14  Phos  3.4     01-24  Mg     2.1     01-24    TPro  8.5<H>  /  Alb  4.2  /  TBili  0.7  /  DBili  x   /  AST  27  /  ALT  16  /  AlkPhos  82  01-23    PT/INR - ( 23 Jan 2018 03:46 )   PT: 13.0 sec;   INR: 1.19 ratio         PTT - ( 23 Jan 2018 03:46 )  PTT:36.9 sec      MICROBIOLOGY:  RECENT CULTURES:  01-23 .Blood Blood XXXX XXXX   No growth to date.    01-23 .Blood Blood XXXX XXXX   No growth to date.          RADIOLOGY & ADDITIONAL STUDIES:   04/30/18 107/71   03/30/18 115/75   11/07/17 102/60    Weight from Last 3 Encounters:   04/30/18 61 kg (134 lb 8 oz)   03/30/18 61 kg (134 lb 8 oz)   11/07/17 63.6 kg (140 lb 4 oz)              We Performed the Following     HC OT EVAL, LOW COMPLEXITY     MANUAL THER TECH,1+REGIONS,EA 15 MIN     ORTHOTIC MGMT AND TRAINING, EACH 15 MIN     THERAPEUTIC EXERCISES        Primary Care Provider Office Phone # Fax #    Paul Sabina Ramos [phone] [address] [place] [address]        Equal Access to Services     ALEJA FREITAS : Hadii derek Hennessy, linda toth, angella muhammad, aubrie das. So [facility] [phone].    ATENCIÓN: Si habla español, tiene a rodriguez disposición servicios gratuitos de asistencia lingüística. [facility] [phone].    We comply with applicable federal civil rights laws and [place] laws. We do not discriminate on the basis of race, color, national origin, age, disability, sex, sexual orientation, or gender identity.            Thank you!     Thank you for choosing [facility]  for your care. Our goal is always to provide you with excellent care. Hearing back from our patients is one way we can continue to improve our services. Please take a few minutes to complete the written survey that you may receive in the mail after your visit with us. Thank you!             Your Updated Medication List - Protect others around you: Learn how to safely use, store and throw away your medicines at www.disposemymeds.org.          This list is accurate as of 8/7/18  3:31 PM.  Always use your most recent med list.                   Brand Name Dispense Instructions for use Diagnosis    ascorbic acid 500 MG tablet    VITAMIN C     Take 500 mg by mouth daily.        calcium carbonate 500 MG tablet    OS-HANS 500 mg [place]. Ca     Take 500 mg by mouth daily        * MULTIVITAL PO      Take 1 tablet by mouth daily.        *  EYE VITAMINS PO           OMEPRAZOLE PO      Take 20 mg by mouth        propranolol 20 MG tablet    INDERAL    60 tablet    TAKE 1/2 TO 1 TABLET(10 TO 20 MG) BY MOUTH FOUR TIMES DAILY AS NEEDED    Anxiety       VITAMIN D (CHOLECALCIFEROL) PO      Take 4,000 Units by mouth daily        VITAMIN-B COMPLEX PO           * Notice:  This list has 2 medication(s) that are the same as other medications prescribed for you. Read the directions carefully, and ask your doctor or other care provider to review them with you.       Infectious Diseases - Attending at Dr. Armenta    HPI:  54 yo female with PMH: HTN admitted with c/o sore throat that started yesterday.  Pt stated that the pain started abruptly during the day and got progressively worse despite taking codeine.  Near the end of the night pt stated that it hurt so much that she couldn't swallow anything.  She did c/o of mild cough but non-productive, more of just salivary secretions.  Pt called 911 when she felt her throat closing up and felt like she was gasping for air.  On arrival to ER pt noted to have "hot potato" voice and was given a dose of decadron and toradol with pt having immediate relief of her symptoms stating that she wasn't having any more significant pain and does not feeling of throat closing.  Neck XR in ER suspicious for epiglottitis (23 Jan 2018 16:02)      PAST MEDICAL & SURGICAL HISTORY:  HTN (hypertension)      Allergies    No Known Allergies    Intolerances        FAMILY HISTORY:no pertinent history       SOCIAL HISTORY:non smoker    REVIEW OF SYSTEMS:    Constitutional: No fever, weight loss or fatigue  Eyes: No eye pain, visual disturbances, or discharge  ENT:  throat swelling with dysphagia+(resolved)  Neck: No pain or stiffness  Respiratory: No cough, wheezing, chills or hemoptysis  Cardiovascular: No chest pain, palpitations, shortness of breath, dizziness or leg swelling  Gastrointestinal: No abdominal or epigastric pain. No nausea, vomiting or hematemesis; No diarrhea or constipation. No melena or hematochezia.  Genitourinary: No dysuria, frequency, hematuria or incontinence  Neurological: No headaches, memory loss, loss of strength, numbness or tremors  Skin: No itching, burning, rashes or lesions       MEDICATIONS  (STANDING):  ALBUTerol    90 MICROgram(s) HFA Inhaler 1 Puff(s) Inhalation every 4 hours  ALBUTerol/ipratropium for Nebulization 3 milliLiter(s) Nebulizer every 6 hours  cefTRIAXone   IVPB 1 Gram(s) IV Intermittent every 24 hours  cefTRIAXone   IVPB      enoxaparin Injectable 40 milliGRAM(s) SubCutaneous daily  lactated ringers. 1000 milliLiter(s) (50 mL/Hr) IV Continuous <Continuous>  pantoprazole  Injectable 40 milliGRAM(s) IV Push daily  tiotropium 18 MICROgram(s) Capsule 1 Capsule(s) Inhalation daily  vancomycin  IVPB 1000 milliGRAM(s) IV Intermittent every 8 hours    MEDICATIONS  (PRN):  benzocaine 15 mG/menthol 3.6 mG Lozenge 1 Lozenge Oral every 4 hours PRN Sore Throat      Vital Signs Last 24 Hrs  T(C): 36.6 (24 Jan 2018 16:46), Max: 36.6 (24 Jan 2018 11:53)  T(F): 97.9 (24 Jan 2018 16:46), Max: 97.9 (24 Jan 2018 11:53)  HR: 77 (24 Jan 2018 17:02) (68 - 97)  BP: 123/78 (24 Jan 2018 16:46) (123/78 - 150/85)  BP(mean): 101 (24 Jan 2018 00:00) (89 - 101)  RR: 17 (24 Jan 2018 16:46) (17 - 22)  SpO2: 97% (24 Jan 2018 17:02) (96% - 99%)    PHYSICAL EXAM:    Constitutional: NAD, well-groomed, well-developed  HEENT: PERRLA, EOMI, currently no pharyngeal erythema  or exudate  Neck: No LAD, No JVD  Back: Normal spine flexure, No CVA tenderness  Respiratory: CTAB/L  Cardiovascular: S1 and S2, RRR, no M/G/R  Gastrointestinal: BS+, soft, NT/ND  Extremities: No peripheral edema  Vascular: 2+ peripheral pulses  Neurological: A/O x 3, no focal deficits  Skin: No rashes      LABS:                        13.0   9.42  )-----------( 306      ( 24 Jan 2018 10:14 )             37.9     01-24    143  |  105  |  15  ----------------------------<  156<H>  4.0   |  31  |  0.94    Ca    9.0      24 Jan 2018 10:14  Phos  3.4     01-24  Mg     2.1     01-24    TPro  8.5<H>  /  Alb  4.2  /  TBili  0.7  /  DBili  x   /  AST  27  /  ALT  16  /  AlkPhos  82  01-23    PT/INR - ( 23 Jan 2018 03:46 )   PT: 13.0 sec;   INR: 1.19 ratio         PTT - ( 23 Jan 2018 03:46 )  PTT:36.9 sec      MICROBIOLOGY:  RECENT CULTURES:  01-23 .Blood   Blood XXXX XXXX   No growth to date.    01-23 .Blood Blood XXXX XXXX   No growth to date.          RADIOLOGY & ADDITIONAL STUDIES:  < from: CT Neck Soft Tissue w/ IV Cont (01.23.18 @ 05:28) >        IMPRESSION:      No pharyngeal lesion seen. 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 Diffuse degenerative   changes of the cervical spine. Multiple thyroid nodules. See above.    < end of copied text >

## 2018-08-14 ENCOUNTER — THERAPY VISIT (OUTPATIENT)
Dept: OCCUPATIONAL THERAPY | Facility: CLINIC | Age: 68
End: 2018-08-14
Payer: COMMERCIAL

## 2018-08-14 DIAGNOSIS — M79.641 PAIN OF RIGHT HAND: ICD-10-CM

## 2018-08-14 PROCEDURE — 97110 THERAPEUTIC EXERCISES: CPT | Mod: GO | Performed by: OCCUPATIONAL THERAPIST

## 2018-08-14 NOTE — MR AVS SNAPSHOT
After Visit Summary   8/14/2018    Martha Hayden    MRN: [id number]           Patient Information     Date Of Birth          1950        Visit Information        Provider Department      8/14/2018 12:00 PM Yesenia Aranda [facility]        Today's Diagnoses     Pain of right hand           Follow-ups after your visit        Your next 10 appointments already scheduled     Aug 27, 2018  2:00 PM CDT   LAURO Hand with Yesenia CHANG [facility] ([facility] [place])    [address]  4th [facility] [address] [phone]              Who to contact     If you have questions or need follow up information about today's clinic visit or your schedule please contact [facility] HAND [facility]ER[facility] directly at [phone].  Normal or non-critical lab and imaging results will be communicated to you by MyChart, letter or phone within 4 business days after the clinic has received the results. If you do not hear from us within 7 days, please contact the clinic through MyChart or phone. If you have a critical or abnormal lab result, we will notify you by phone as soon as possible.  Submit refill requests through [employer] or call your pharmacy and they will forward the refill request to us. Please allow 3 business days for your refill to be completed.          Additional Information About Your Visit        MyChart Information     [employer] gives you secure access to your electronic health record. If you see a primary care provider, you can also send messages to your care team and make appointments. If you have questions, please call your primary care clinic.  If you do not have a primary care provider, please call [phone] and they will assist you.        Care EveryWhere ID     This is your Care EveryWhere ID. This could be used by other organizations to access your [place] medical records  [id number]         Blood Pressure from Last 3 Encounters:   04/30/18  107/71   03/30/18 115/75   11/07/17 102/60    Weight from Last 3 Encounters:   04/30/18 61 kg (134 lb 8 oz)   03/30/18 61 kg (134 lb 8 oz)   11/07/17 63.6 kg (140 lb 4 oz)              We Performed the Following     THERAPEUTIC EXERCISES        Primary Care Provider Office Phone # Fax #    Paul Sabina Ramos [phone] [address] [place], [place] [id number]        Equal Access to Services     LIVE FREITAS : Hadii derek ku hadasho Soomaali, waaxda luqadaha, qaybta kaalmada adeegyaankita, aubrie reyes . So [facility] [phone].    ATENCIÓN: Si habla español, tiene a rodriguez disposición servicios gratuitos de asistencia lingüística. [facility] [phone].    We comply with applicable federal civil rights laws and [place] laws. We do not discriminate on the basis of race, color, national origin, age, disability, sex, sexual orientation, or gender identity.            Thank you!     Thank you for choosing [facility] THERAPY  for your care. Our goal is always to provide you with excellent care. Hearing back from our patients is one way we can continue to improve our services. Please take a few minutes to complete the written survey that you may receive in the mail after your visit with us. Thank you!             Your Updated Medication List - Protect others around you: Learn how to safely use, store and throw away your medicines at www.disposemymeds.org.          This list is accurate as of 8/14/18  5:49 PM.  Always use your most recent med list.                   Brand Name Dispense Instructions for use Diagnosis    ascorbic acid 500 MG tablet    VITAMIN C     Take 500 mg by mouth daily.        calcium carbonate 500 MG tablet    OS-HANS 500 mg [place]. Ca     Take 500 mg by mouth daily        * MULTIVITAL PO      Take 1 tablet by mouth daily.        * EYE VITAMINS PO           OMEPRAZOLE PO      Take 20 mg by mouth        propranolol 20 MG tablet    INDERAL    60 tablet     TAKE 1/2 TO 1 TABLET(10 TO 20 MG) BY MOUTH FOUR TIMES DAILY AS NEEDED    Anxiety       VITAMIN D (CHOLECALCIFEROL) PO      Take 4,000 Units by mouth daily        VITAMIN-B COMPLEX PO           * Notice:  This list has 2 medication(s) that are the same as other medications prescribed for you. Read the directions carefully, and ask your doctor or other care provider to review them with you.

## 2018-08-14 NOTE — PROGRESS NOTES
SOAP note objective information for 8/14/2018.    Pain: Currently, no pain. Patient has 4-5/10 pain with activities which require sustained  and pinch.    ROM:  Thumb  8/7/2018 8/14/2018   AROM(PROM) Left Right Right   MP  71 65  65   IP  69  73 70   RAbd  41  39 40   PAbd  40  34 40   K[place]andji Opposition Scale (0-10/10)  10  10 10       Next Visit:  Comfort Cool splint  Ergonomic considerations for ADL/hobbies  Progress stability program

## 2018-11-15 ENCOUNTER — RADIANT APPOINTMENT (OUTPATIENT)
Dept: MAMMOGRAPHY | Facility: CLINIC | Age: 68
End: 2018-11-15
Attending: FAMILY MEDICINE
Payer: MEDICARE

## 2018-11-15 DIAGNOSIS — Z12.31 VISIT FOR SCREENING MAMMOGRAM: ICD-10-CM

## 2018-11-15 PROCEDURE — 77067 SCR MAMMO BI INCL CAD: CPT

## 2018-12-03 PROBLEM — M79.641 PAIN OF RIGHT HAND: Status: RESOLVED | Noted: 2018-08-07 | Resolved: 2018-12-03

## 2018-12-03 NOTE — PROGRESS NOTES
Discharge Note -Hand Therapy    Initial Evaluation Date:  8/7/2018  Current Date: 12/3/2018  Number of Visits: 2    S:   Pt did not follow up for scheduled visits and did not respond to telephone calls to reschedule appointments.   Call to pt revealed that symptoms are resolving and they would like to continue independently.    O:  Objective information is not available as pt has not returned for therapy.  Last visit or last objective information will serve as final entry.      A: Pt did not return for further treatment.    Status of goals is unknown due to lack of followup of patient.  Patient is independent in home exercise program.    P:  Discharge from Hand Therapy; continue home program.

## 2019-10-05 ENCOUNTER — HEALTH MAINTENANCE LETTER (OUTPATIENT)
Age: 69
End: 2019-10-05

## 2020-02-10 ENCOUNTER — HEALTH MAINTENANCE LETTER (OUTPATIENT)
Age: 70
End: 2020-02-10

## 2020-04-21 ENCOUNTER — MEDICAL CORRESPONDENCE (OUTPATIENT)
Dept: HEALTH INFORMATION MANAGEMENT | Facility: CLINIC | Age: 70
End: 2020-04-21

## 2020-05-24 ENCOUNTER — MYC MEDICAL ADVICE (OUTPATIENT)
Dept: FAMILY MEDICINE | Facility: CLINIC | Age: 70
End: 2020-05-24

## 2020-05-26 NOTE — TELEPHONE ENCOUNTER
Routing to neurology team.   It seems like Dr Antonio is no longer working.   Will route to Dr Gutierres who saw her last.   Defer decision to remove diagnosis of MS to neurology.

## 2020-06-12 ENCOUNTER — MYC MEDICAL ADVICE (OUTPATIENT)
Dept: FAMILY MEDICINE | Facility: CLINIC | Age: 70
End: 2020-06-12

## 2020-06-13 NOTE — TELEPHONE ENCOUNTER
Dr Ramos - Please advise.  Does patient need an appointment or are you able to update diagnosis?  Joan Jiménez RN

## 2020-08-17 ENCOUNTER — TRANSFERRED RECORDS (OUTPATIENT)
Dept: HEALTH INFORMATION MANAGEMENT | Facility: CLINIC | Age: 70
End: 2020-08-17

## 2020-09-18 ENCOUNTER — TRANSFERRED RECORDS (OUTPATIENT)
Dept: HEALTH INFORMATION MANAGEMENT | Facility: CLINIC | Age: 70
End: 2020-09-18

## 2020-09-18 ENCOUNTER — MEDICAL CORRESPONDENCE (OUTPATIENT)
Dept: HEALTH INFORMATION MANAGEMENT | Facility: CLINIC | Age: 70
End: 2020-09-18

## 2020-11-14 ENCOUNTER — HEALTH MAINTENANCE LETTER (OUTPATIENT)
Age: 70
End: 2020-11-14

## 2021-02-06 ENCOUNTER — HEALTH MAINTENANCE LETTER (OUTPATIENT)
Age: 71
End: 2021-02-06

## 2021-04-03 ENCOUNTER — HEALTH MAINTENANCE LETTER (OUTPATIENT)
Age: 71
End: 2021-04-03

## 2021-09-12 ENCOUNTER — HEALTH MAINTENANCE LETTER (OUTPATIENT)
Age: 71
End: 2021-09-12

## 2022-02-17 PROBLEM — K21.9 GASTROESOPHAGEAL REFLUX DISEASE: Status: ACTIVE | Noted: 2017-11-07

## 2022-03-15 NOTE — MR AVS SNAPSHOT
After Visit Summary   10/2/2017    Martha Hayden    MRN: [id number]           Patient Information     Date Of Birth          1950        Visit Information        Provider Department      10/2/2017 11:40 Paul Irvin MD [facility]        Today's Diagnoses     Throat pain    -  1       Follow-ups after your visit        Who to contact     If you have questions or need follow up information about today's clinic visit or your schedule please contact [facility] directly at [phone].  Normal or non-critical lab and imaging results will be communicated to you by "[employer]hart, letter or phone within 4 business days after the clinic has received the results. If you do not hear from us within 7 days, please contact the clinic through [employer] or phone. If you have a critical or abnormal lab result, we will notify you by phone as soon as possible.  Submit refill requests through [employer] or call your pharmacy and they will forward the refill request to us. Please allow 3 business days for your refill to be completed.          Additional Information About Your Visit        MyChart Information     [employer] gives you secure access to your electronic health record. If you see a primary care provider, you can also send messages to your care team and make appointments. If you have questions, please call your primary care clinic.  If you do not have a primary care provider, please call [phone] and they will assist you.        Care EveryWhere ID     This is your Care EveryWhere ID. This could be used by other organizations to access your [place] medical records  [id number]        Your Vitals Were     Pulse Temperature Pulse Oximetry BMI (Body Mass Index)          66 98.7  F (37.1  C) (Oral) 98% 24.84 kg/m2         Blood Pressure from Last 3 Encounters:   10/02/17 110/70   06/27/17 108/72   03/10/17 102/64    Weight from Last 3 Encounters:   10/02/17 140 lb 4 oz  Received Final  PDL 1 [employer] report . Scanned to media   Dr. Marcos notified.    (63.6 kg)   06/27/17 137 lb 12 oz (62.5 kg)   03/10/17 140 lb (63.5 kg)              We Performed the Following     Beta strep group A culture     Strep, Rapid Screen        Primary Care Provider Office Phone # Fax #    Paul Sabina Ramos [phone] [address] [place], [place] [id number]        Equal Access to Services     LIVE FREITAS : Hadii aad ku hadasho Soomaali, waaxda luqadaha, qaybta kaalmada adeegyada, waxay idiin hayaan adeeg kleberarnold lasulman ah. So [facility] [phone].    ATENCIÓN: Si long seymour, tiene a rodriguez disposición servicios gratuitos de asistencia lingüística. Llame al [phone].    We comply with applicable federal civil rights laws and [place] laws. We do not discriminate on the basis of race, color, national origin, age, disability, sex, sexual orientation, or gender identity.            Thank you!     Thank you for choosing [facility]  for your care. Our goal is always to provide you with excellent care. Hearing back from our patients is one way we can continue to improve our services. Please take a few minutes to complete the written survey that you may receive in the mail after your visit with us. Thank you!             Your Updated Medication List - Protect others around you: Learn how to safely use, store and throw away your medicines at www.disposemymeds.org.          This list is accurate as of: 10/2/17  3:32 PM.  Always use your most recent med list.                   Brand Name Dispense Instructions for use Diagnosis    ascorbic acid 500 MG tablet    VITAMIN C     Take 500 mg by mouth daily.        calcium carbonate 1250 MG tablet    OS-HANS 500 mg [place]. Ca     Take 500 mg by mouth daily        ibuprofen 200 MG tablet    ADVIL/MOTRIN     Take 1-2 tablets by mouth. Every 4-6 hours as needed        MULTIVITAL PO      Take 1 tablet by mouth daily.        propranolol 20 MG tablet    INDERAL    60 tablet    TAKE 1/2 TO 1 TABLET(10 TO 20 MG) BY  [1 Year Ago] : 1 year ago MOUTH FOUR TIMES DAILY AS NEEDED    Anxiety       VITAMIN D (CHOLECALCIFEROL) PO      Take 4,000 Units by mouth daily        VITAMIN-B COMPLEX PO           ZOLOFT PO      Take 50 mg by mouth daily           [Fair] : being in fair health [Healthy Diet] : a healthy diet [Weight Concerns] : weight concens [Last Mammogram ___] : Last Mammogram was [unfilled] [Last Bone Density ___] : Last bone density studies [unfilled] [Last Colonoscopy ___] : Last colonoscopy [unfilled] [Last Pap ___] : Last cervical pap smear was [unfilled] [Postmenopausal] : is postmenopausal [Definite:  ___ (Date)] : the last menstrual period was [unfilled] [Currently In Menopause] : currently in menopause [Regular Exercise] : not exercising regularly [Experiencing Menopausal Sxs] : not experiencing menopausal symptoms [Sexually Active] : is not sexually active

## 2022-04-24 ENCOUNTER — HEALTH MAINTENANCE LETTER (OUTPATIENT)
Age: 72
End: 2022-04-24

## 2022-08-01 ENCOUNTER — TRANSFERRED RECORDS (OUTPATIENT)
Dept: HEALTH INFORMATION MANAGEMENT | Facility: CLINIC | Age: 72
End: 2022-08-01

## 2022-11-19 ENCOUNTER — HEALTH MAINTENANCE LETTER (OUTPATIENT)
Age: 72
End: 2022-11-19

## 2023-04-09 ENCOUNTER — HEALTH MAINTENANCE LETTER (OUTPATIENT)
Age: 73
End: 2023-04-09

## 2023-06-01 ENCOUNTER — HEALTH MAINTENANCE LETTER (OUTPATIENT)
Age: 73
End: 2023-06-01

## 2023-09-07 ENCOUNTER — OFFICE VISIT (OUTPATIENT)
Dept: OPHTHALMOLOGY | Facility: CLINIC | Age: 73
End: 2023-09-07
Attending: OPHTHALMOLOGY
Payer: COMMERCIAL

## 2023-09-07 DIAGNOSIS — H35.30 ARMD (AGE-RELATED MACULAR DEGENERATION), BILATERAL: ICD-10-CM

## 2023-09-07 DIAGNOSIS — H35.30 ARMD (AGE-RELATED MACULAR DEGENERATION), BILATERAL: Primary | ICD-10-CM

## 2023-09-07 PROCEDURE — 92250 FUNDUS PHOTOGRAPHY W/I&R: CPT | Performed by: OPHTHALMOLOGY

## 2023-09-07 PROCEDURE — 99207 FUNDUS PHOTOS OU (BOTH EYES): CPT | Mod: 26 | Performed by: OPHTHALMOLOGY

## 2023-09-07 PROCEDURE — 99204 OFFICE O/P NEW MOD 45 MIN: CPT | Performed by: OPHTHALMOLOGY

## 2023-09-07 PROCEDURE — G0463 HOSPITAL OUTPT CLINIC VISIT: HCPCS | Performed by: OPHTHALMOLOGY

## 2023-09-07 PROCEDURE — 92134 CPTRZ OPH DX IMG PST SGM RTA: CPT | Performed by: OPHTHALMOLOGY

## 2023-09-07 PROCEDURE — 99207 FUNDUS AUTOFLUORESCENCE IMAGE (FAF) OU (BOTH EYES): CPT | Mod: 26 | Performed by: OPHTHALMOLOGY

## 2023-09-07 RX ORDER — CARBOXYMETHYLCELLULOSE SODIUM 5 MG/ML
1 SOLUTION/ DROPS OPHTHALMIC 3 TIMES DAILY PRN
COMMUNITY

## 2023-09-07 ASSESSMENT — VISUAL ACUITY
OS_PH_CC+: -2
OD_SC: J1+
METHOD: SNELLEN - LINEAR
OS_PH_CC: 20/30
OD_CC+: -1
OS_CC: 20/40
OD_CC: 20/30
OS_SC: J1+

## 2023-09-07 ASSESSMENT — CONF VISUAL FIELD
OS_NORMAL: 1
OS_INFERIOR_NASAL_RESTRICTION: 0
METHOD: COUNTING FINGERS
OD_SUPERIOR_NASAL_RESTRICTION: 0
OD_NORMAL: 1
OD_SUPERIOR_TEMPORAL_RESTRICTION: 0
OD_INFERIOR_TEMPORAL_RESTRICTION: 0
OS_SUPERIOR_TEMPORAL_RESTRICTION: 0
OS_INFERIOR_TEMPORAL_RESTRICTION: 0
OS_SUPERIOR_NASAL_RESTRICTION: 0
OD_INFERIOR_NASAL_RESTRICTION: 0

## 2023-09-07 ASSESSMENT — TONOMETRY
OS_IOP_MMHG: 18
OD_IOP_MMHG: 18
IOP_METHOD: TONOPEN

## 2023-09-07 ASSESSMENT — PATIENT HEALTH QUESTIONNAIRE - PHQ9: SUM OF ALL RESPONSES TO PHQ QUESTIONS 1-9: 3

## 2023-09-07 ASSESSMENT — REFRACTION_WEARINGRX
OS_CYLINDER: +1.00
OD_AXIS: 180
OD_SPHERE: -5.25
OS_SPHERE: -5.75
OS_AXIS: 010
OD_CYLINDER: +1.25

## 2023-09-07 ASSESSMENT — SLIT LAMP EXAM - LIDS
COMMENTS: NORMAL
COMMENTS: NORMAL

## 2023-09-07 NOTE — NURSING NOTE
Chief Complaints and History of Present Illnesses   Patient presents with    Dry Macular Degeneration Evaluation     Chief Complaint(s) and History of Present Illness(es)       Dry Macular Degeneration Evaluation              Laterality: both eyes    Onset: gradual    Associated symptoms: dryness.  Negative for eye pain, tearing, photophobia, flashes and floaters    Pain scale: 0/10              Comments    Arnold Is here new to clinic self referred for evaluation of macular degeneration. She states she has cataracts and feels they are getting worse. She says her glasses are current but she does not see as well as she used to, and no longer drives at night. History of ocular migraines, and retinal tear.     Carlos Toth COT 10:17 AM September 7, 2023

## 2023-09-07 NOTE — PROGRESS NOTES
CC: Age related macular degeneration consult  First appointment with me  Referred by: from a patient of [facility] - Ms Marek  HPI: Martha Hayden is a 73 year old year-old patient with history of Age related macular degeneration and Cataracts. She says her glasses are current but she does not see as well as she used to, and no longer drives at night because of  glare.    PMH: MM in situ, anxiety, headache , insomnia, esophagitis; low back pain  Past ocular history: History of ocular migraines, and retinal tear.     Retinal Imaging:  Optical Coherence Tomography 09/07/23  drusen and Retinal pigment epithelium changes in both eyes; no subretinal fluid   Right eye with Epiretinal membrane; and cystic changes   Fundus photos consistent with exam 09/07/23   Autofluorescence: macula hyper and hypoautofluorescent spots consistent with drusen    Assessment & Plan:    # Dry Age related macular degeneration both eyes   AREDS supplementation is recommended.  Weekly Amsler Grid use was discussed and training performed.  A diet of leafy green vegetables was encouraged.  Return precautions were given.    # Posterior vitreous detachment (PVD) both eyes   Retina detachment precautions were discussed with the patient (presence or increased in flashes, floaters or a curtain in the visual field) and was asked to return if any of the those occur     # Cataracts both eyes  Becoming visually significant   Recommend cataract evaluation with BAT  Discussed with patient cataract surgery with refractive correction of myopia. 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  Will think about it     # history of retina tear left eye  # VR adhesion left eye   Retina attached both eyes on scleral depression      # Epiretinal membrane right eye   With pseudohole and cystic changes   Observe    #myopia  Retina detachment precautions were discussed with the patient (presence or increased in flashes, floaters or a curtain in the visual  field) and was asked to return if any of the those occur     Follow up in 4 months BAT; intraocular lens CALCS       ~~~~~~~~~~~~~~~~~~~~~~~~~~~~~~~~~~   Complete documentation of historical and exam elements from today's encounter can be found in the full encounter summary report (not reduplicated in this progress note).  I personally obtained the chief complaint(s) and history of present illness.  I confirmed and edited as necessary the review of systems, past medical/surgical history, family history, social history, and examination findings as documented by others; and I examined the patient myself.  I personally reviewed the relevant tests, images, and reports as documented above.  I formulated and edited as necessary the assessment and plan and discussed the findings and management plan with the patient and family    Dominique Augustine MD  Professor of Ophthalmology.   Vitreo-retinal surgeon   Department of Ophthalmology and Visual Neurosciences   [facility]  Phone: [phone]   Fax: [phone]

## 2023-09-27 ENCOUNTER — MYC MEDICAL ADVICE (OUTPATIENT)
Dept: OPHTHALMOLOGY | Facility: CLINIC | Age: 73
End: 2023-09-27
Payer: COMMERCIAL

## 2023-10-05 ENCOUNTER — OFFICE VISIT (OUTPATIENT)
Dept: OPHTHALMOLOGY | Facility: CLINIC | Age: 73
End: 2023-10-05
Attending: OPHTHALMOLOGY
Payer: COMMERCIAL

## 2023-10-05 DIAGNOSIS — H35.30 ARMD (AGE-RELATED MACULAR DEGENERATION), BILATERAL: Primary | ICD-10-CM

## 2023-10-05 PROCEDURE — 99213 OFFICE O/P EST LOW 20 MIN: CPT | Performed by: OPHTHALMOLOGY

## 2023-10-05 PROCEDURE — 92134 CPTRZ OPH DX IMG PST SGM RTA: CPT | Performed by: OPHTHALMOLOGY

## 2023-10-05 PROCEDURE — G0463 HOSPITAL OUTPT CLINIC VISIT: HCPCS | Mod: 25 | Performed by: OPHTHALMOLOGY

## 2023-10-05 ASSESSMENT — VISUAL ACUITY
OS_CC+: -1
OD_CC: 20/30
METHOD: SNELLEN - LINEAR
CORRECTION_TYPE: GLASSES
OD_CC+: -2
OS_CC: 20/40

## 2023-10-05 ASSESSMENT — CONF VISUAL FIELD
OD_SUPERIOR_NASAL_RESTRICTION: 0
OD_SUPERIOR_TEMPORAL_RESTRICTION: 0
OD_INFERIOR_TEMPORAL_RESTRICTION: 0
OS_NORMAL: 1
OD_NORMAL: 1
OS_INFERIOR_TEMPORAL_RESTRICTION: 0
OS_SUPERIOR_NASAL_RESTRICTION: 0
OS_SUPERIOR_TEMPORAL_RESTRICTION: 0
OD_INFERIOR_NASAL_RESTRICTION: 0
OS_INFERIOR_NASAL_RESTRICTION: 0

## 2023-10-05 ASSESSMENT — REFRACTION_WEARINGRX
OD_CYLINDER: +1.25
OD_SPHERE: -5.25
OS_CYLINDER: +1.00
OS_AXIS: 010
OS_SPHERE: -5.75
OD_AXIS: 180

## 2023-10-05 ASSESSMENT — SLIT LAMP EXAM - LIDS
COMMENTS: NORMAL
COMMENTS: NORMAL

## 2023-10-05 ASSESSMENT — TONOMETRY
OD_IOP_MMHG: 10
OS_IOP_MMHG: 10
IOP_METHOD: TONOPEN

## 2023-10-05 NOTE — PROGRESS NOTES
CC: Age related macular degeneration follow up   Referred by: from a patient of [facility] - Ms Jara  HPI: Martha Hayden is a 73 year old year-old patient with history of Age related macular degeneration and Cataracts. She says her glasses are current but she does not see as well as she used to, and no longer drives at night because of  glare.  Interim: noticed a new changes in the AMSLER testing this am    PMH: MM in situ, anxiety, headache , insomnia, esophagitis; low back pain  Past ocular history: History of ocular migraines, and retinal tear.     Retinal Imaging:  Optical Coherence Tomography 09/07/23  drusen and Retinal pigment epithelium changes in both eyes; no subretinal fluid   Right eye with Epiretinal membrane; and cystic changes   Fundus photos consistent with exam 09/07/23   Autofluorescence: macula hyper and hypoautofluorescent spots consistent with drusen    Assessment & Plan:    # Dry Age related macular degeneration both eyes   AREDS supplementation is recommended.  Weekly Amsler Grid use was discussed and training performed.  A diet of leafy green vegetables was encouraged.  Return precautions were given.    # Posterior vitreous detachment (PVD) both eyes   Retina detachment precautions were discussed with the patient (presence or increased in flashes, floaters or a curtain in the visual field) and was asked to return if any of the those occur     # Cataracts both eyes  Becoming visually significant   Recommend cataract evaluation with BAT  Discussed with patient cataract surgery with refractive correction of myopia. 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  Will think about it     # history of retina tear right eye   # VR adhesion left eye   Retina attached both eyes on scleral depression      # Epiretinal membrane right eye   With pseudohole and cystic changes   Observe    #myopia  Retina detachment precautions were discussed with the patient (presence or increased in  flashes, floaters or a curtain in the visual field) and was asked to return if any of the those occur   Follow up in 4 months with Optical Coherence Tomography; BAT and dilated exam       ~~~~~~~~~~~~~~~~~~~~~~~~~~~~~~~~~~   Complete documentation of historical and exam elements from today's encounter can be found in the full encounter summary report (not reduplicated in this progress note).  I personally obtained the chief complaint(s) and history of present illness.  I confirmed and edited as necessary the review of systems, past medical/surgical history, family history, social history, and examination findings as documented by others; and I examined the patient myself.  I personally reviewed the relevant tests, images, and reports as documented above.  I formulated and edited as necessary the assessment and plan and discussed the findings and management plan with the patient and family    Dominique Augustine MD  Professor of Ophthalmology.   Vitreo-retinal surgeon   Department of Ophthalmology and Visual Neurosciences   [facility]  Phone: [phone]   Fax: [phone]

## 2023-10-05 NOTE — NURSING NOTE
Chief Complaints and History of Present Illnesses   Patient presents with    Macular Degeneration Follow Up     Chief Complaint(s) and History of Present Illness(es)       Macular Degeneration Follow Up              Laterality: both eyes    Onset: 4 weeks ago              Comments    Pt. States that she is seeing new distortion on amsler grid LE. The lines making the boxes are doubled and wavy. RE is also now seeing some new distortion today.No pain BE. Some dryness BE. No flashes or floaters BE.   Pily Calloway COT 2:10 PM October 5, 2023

## 2023-12-26 ENCOUNTER — TELEPHONE (OUTPATIENT)
Dept: OPHTHALMOLOGY | Facility: CLINIC | Age: 73
End: 2023-12-26
Payer: COMMERCIAL

## 2024-01-03 DIAGNOSIS — H35.30 ARMD (AGE-RELATED MACULAR DEGENERATION), BILATERAL: Primary | ICD-10-CM

## 2024-01-25 ENCOUNTER — OFFICE VISIT (OUTPATIENT)
Dept: OPHTHALMOLOGY | Facility: CLINIC | Age: 74
End: 2024-01-25
Attending: OPHTHALMOLOGY
Payer: COMMERCIAL

## 2024-01-25 DIAGNOSIS — H52.219 IRREGULAR ASTIGMATISM: ICD-10-CM

## 2024-01-25 DIAGNOSIS — H25.13 NUCLEAR SCLEROSIS OF BOTH EYES: Primary | ICD-10-CM

## 2024-01-25 DIAGNOSIS — H52.223 REGULAR ASTIGMATISM OF BOTH EYES: ICD-10-CM

## 2024-01-25 DIAGNOSIS — H35.30 ARMD (AGE-RELATED MACULAR DEGENERATION), BILATERAL: ICD-10-CM

## 2024-01-25 PROCEDURE — 92025 CPTRIZED CORNEAL TOPOGRAPHY: CPT | Performed by: OPHTHALMOLOGY

## 2024-01-25 PROCEDURE — G0463 HOSPITAL OUTPT CLINIC VISIT: HCPCS | Performed by: OPHTHALMOLOGY

## 2024-01-25 PROCEDURE — 99214 OFFICE O/P EST MOD 30 MIN: CPT | Performed by: OPHTHALMOLOGY

## 2024-01-25 PROCEDURE — 92134 CPTRZ OPH DX IMG PST SGM RTA: CPT | Performed by: OPHTHALMOLOGY

## 2024-01-25 PROCEDURE — 76519 ECHO EXAM OF EYE: CPT | Performed by: OPHTHALMOLOGY

## 2024-01-25 RX ORDER — HYDROXYZINE HYDROCHLORIDE 10 MG/1
1 TABLET, FILM COATED ORAL AT BEDTIME
COMMUNITY
Start: 2023-09-14

## 2024-01-25 ASSESSMENT — SLIT LAMP EXAM - LIDS
COMMENTS: NORMAL
COMMENTS: NORMAL

## 2024-01-25 ASSESSMENT — VISUAL ACUITY
OD_BAT_HIGH: 20/100
OD_CC+: -1
OS_CC: 20/40
CORRECTION_TYPE: GLASSES
OS_CC+: +2
OD_CC: 20/40
OS_BAT_MED: 20/50-1
METHOD: SNELLEN - LINEAR
OS_BAT_HIGH: 20/125+1
OS_BAT_LOW: 20/40
OD_BAT_LOW: 20/40
OD_BAT_MED: 20/50

## 2024-01-25 ASSESSMENT — REFRACTION_WEARINGRX
OD_CYLINDER: +1.25
OS_SPHERE: -5.75
OS_AXIS: 010
OD_AXIS: 180
OD_SPHERE: -5.25
OS_CYLINDER: +1.00

## 2024-01-25 ASSESSMENT — TONOMETRY
OD_IOP_MMHG: 16
IOP_METHOD: TONOPEN
OS_IOP_MMHG: 15

## 2024-01-25 NOTE — PROGRESS NOTES
CC: Age related macular degeneration follow up     Interval history: Has noticed more difficulty reading without her glasses close up as well as driving at night. She has stopped driving at night due to glare. She thinks she might be ready to move forward with cataract surgery.    Previously referred by: from a patient of [facility] - Ms Jara  HPI: Martha Hayden is a 73 year old year-old patient with history of Age related macular degeneration and Cataracts. She says her glasses are current but she does not see as well as she used to, and no longer drives at night because of  glare.  Interim: noticed a new changes in the AMSLER testing this am    PMH: MM in situ, anxiety, headache , insomnia, esophagitis; low back pain  Past ocular history: History of ocular migraines, and retinal tear.     Retinal Imaging:  Optical Coherence Tomography 1/25/24  Right eye with Epiretinal membrane; and cystic changes   Left eye: drusen and Retinal pigment epithelium changes in both eyes; no subretinal fluid; trace Epiretinal membrane     Fundus photos consistent with exam 09/07/23   Autofluorescence: macula hyper and hypoautofluorescent spots consistent with drusen    Assessment & Plan:    # Dry Age related macular degeneration both eyes   AREDS supplementation is recommended.  Weekly Amsler Grid use was discussed and training performed.  A diet of leafy green vegetables was encouraged.  Return precautions were given.    # Posterior vitreous detachment (PVD) both eyes   Retina detachment precautions were discussed with the patient (presence or increased in flashes, floaters or a curtain in the visual field) and was asked to return if any of the those occur     # Cataracts both eyes  Becoming visually significant   BATs to 20/100, 20/125  Discussed with patient cataract surgery with refractive correction of myopia. 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    Plan for Cataract extraction intraocular lens right eye  first   The cataract is visually significant, Reports impacts ADL and has glare     Surgeon procedure time:  45 min  Urgency of Surgery: Routine  Post-op apps needed: 1day; 1 week; 3 weeks  Multi surgeon case: No   H&P completed by primary care physician/PAC   needed: NO  Anesthesia: Peribulbar block and general anesthesia  Patient with history of claustrophobia and PTSD    Aim for: -3.0 both eyes   Patient interested in reading   Dilation:Good  Iris expansion: Not needed  Epinephrine: No  Pseudoexfoliation: NO  Trypan Blue: No   Phacodonesis: No  Cornea guttae: rare  Anticoagulants: NO  Flomax: NO  Candidate for multifocal or toric IOL: possible   Visually significant astigmatism: Discussed elective surgical refractive corrective options  Prior refractive surgery: No  Patient requesting attending only    Will request a 16.5 CC60WF and a 16.5 toric aiming for -2.87      I reviewed the indications, risks, benefits, and alternatives of the proposed surgical procedure. We discussed at length cataracts and the effect of the cataracts on vision.   We discussed the fact that modern cataract surgery is usually successful at alleviating symptoms of glare when the cataract is the causative factor. Other risks were discussed with patient including, but not limited to, failure to improve vision or further loss of vision, bleeding, infection, loss of vision and the remote possibility of complications of anesthesia or need for additional surgery. 1:1000 risk of infection/bleed/loss of eye; 1:100 risk of RD and need for further surgery.  Patient agreed to proceed with surgery.  I provided multiple opportunities for the questions, answered all questions to the best of my ability, and confirmed that my answers and my discussion were understood.     # history of retina tear right eye   # VR adhesion left eye   Retina attached both eyes on scleral depression      # Epiretinal membrane right eye   With pseudohole and cystic  changes   Observe    #myopia  Retina detachment precautions were discussed with the patient (presence or increased in flashes, floaters or a curtain in the visual field) and was asked to return if any of the those occur     Patient wants surgery in spring 2024       Amber Adams MD  Resident Physician, PGY-3  Department of Ophthalmology    ~~~~~~~~~~~~~~~~~~~~~~~~~~~~~~~~~~   Complete documentation of historical and exam elements from today's encounter can be found in the full encounter summary report (not reduplicated in this progress note).  I personally obtained the chief complaint(s) and history of present illness.  I confirmed and edited as necessary the review of systems, past medical/surgical history, family history, social history, and examination findings as documented by others; and I examined the patient myself.  I personally reviewed the relevant tests, images, and reports as documented above.  I formulated and edited as necessary the assessment and plan and discussed the findings and management plan with the patient and family    Dominique Augustine MD  Professor of Ophthalmology.   Vitreo-retinal surgeon   Department of Ophthalmology and Visual Neurosciences   [facility]  Phone: [phone]   Fax: [phone]

## 2024-01-25 NOTE — NURSING NOTE
Chief Complaints and History of Present Illnesses   Patient presents with    Follow Up     ARMD (age-related macular degeneration), bilateral     Chief Complaint(s) and History of Present Illness(es)       Follow Up              Laterality: both eyes    Course: stable    Associated symptoms: dryness, flashes and floaters.  Negative for eye pain and discharge    Treatments tried: artificial tears    Pain scale: 0/10    Comments: ARMD (age-related macular degeneration), bilateral              Comments    She states that her vision has seemed stable in both eyes since her last eye exam.  She sees occasional flashes and floaters with both eyes.    She tells me that she developed a large stye, right upper lid, in December that has since resolved, with Erythromycin ointment and warm compresses use.    Lynn Mcguire, COT 10:30 AM  January 25, 2024

## 2024-01-26 ENCOUNTER — TELEPHONE (OUTPATIENT)
Dept: OPHTHALMOLOGY | Facility: CLINIC | Age: 74
End: 2024-01-26
Payer: COMMERCIAL

## 2024-01-26 PROBLEM — H25.13 NUCLEAR SCLEROSIS OF BOTH EYES: Status: ACTIVE | Noted: 2024-01-25

## 2024-01-26 NOTE — TELEPHONE ENCOUNTER
Patient is scheduled for surgery with: Dr. Augustine    Surgery Date: 4/23 (per patient request)     Location: [facility]    H&P: to be completed by Primary Care team - patient instructed to schedule. Patient stated this will be schedule with Oswaldo Ying     Post-op:  4/24, 5/1, 5/22, in-person visit, with Dr Augustine    Patient will receive a phone call from pre-admission nurses 1-2 days prior to surgery with arrival time and NPO instructions.    Patient aware times are subject to change up until day before surgery.     Patient questions/concerns:  Patient wants to schedule surgery for left eye, message sent requesting case request.      Surgery packet was sent via US mail 1/26      Collette Montoya on 1/26/2024 at 9:04 AM

## 2024-02-05 DIAGNOSIS — H25.12 NUCLEAR SCLEROSIS OF LEFT EYE: Primary | ICD-10-CM

## 2024-02-06 ENCOUNTER — TELEPHONE (OUTPATIENT)
Dept: OPHTHALMOLOGY | Facility: CLINIC | Age: 74
End: 2024-02-06
Payer: COMMERCIAL

## 2024-02-06 NOTE — TELEPHONE ENCOUNTER
Left voicemail for patient regarding scheduling surgery with Dr. Augustine.  Provided contact number to discuss.  [phone]    Collette Montoya, on 2/6/2024 at 3:41 PM

## 2024-02-07 PROBLEM — H25.12 NUCLEAR SCLEROSIS OF LEFT EYE: Status: ACTIVE | Noted: 2024-02-05

## 2024-02-07 NOTE — TELEPHONE ENCOUNTER
Patient is scheduled for surgery with: Dr. Augustine    Surgery Date: 5/7     Location: [facility]    H&P: to be completed by Primary Care team - patient instructed to schedule. Patient stated this will be schedule with [facility] Reilly Ying     Post-op:  5/8, 5/15, 5/29, in-person visit, with Dr Augustine    Patient will receive a phone call from pre-admission nurses 1-2 days prior to surgery with arrival time and NPO instructions.    Patient aware times are subject to change up until day before surgery.     Patient questions/concerns: N/A     Surgery packet was sent via US mail (surgery date and post op appointments) 2/7      Collette Montoya on 2/7/2024 at 8:48 AM

## 2024-02-20 ENCOUNTER — MYC MEDICAL ADVICE (OUTPATIENT)
Dept: OPHTHALMOLOGY | Facility: CLINIC | Age: 74
End: 2024-02-20

## 2024-04-08 NOTE — TELEPHONE ENCOUNTER
Spoke with patient she is having pre op completed next Tuesday 4/16 at [facility]           Anna C. Schoenecker on 4/8/2024 at 12:59 PM

## 2024-04-08 NOTE — TELEPHONE ENCOUNTER
Spoke with patient she is having pre op completed next Tuesday 4/16 at [facility]    Anna C. Schoenecker on 4/8/2024 at 12:46 PM

## 2024-04-22 ENCOUNTER — ANESTHESIA EVENT (OUTPATIENT)
Dept: SURGERY | Facility: AMBULATORY SURGERY CENTER | Age: 74
End: 2024-04-22
Payer: COMMERCIAL

## 2024-04-22 NOTE — ANESTHESIA PREPROCEDURE EVALUATION
Anesthesia Pre-Procedure Evaluation    Patient: Martha Hayden   MRN: [id number] : 1950        Procedure : Procedure(s):  RIGHT EYE PHACOEMULSIFICATION, CATARACT, WITH INTRAOCULAR LENS IMPLANT          Past Medical History:   Diagnosis Date     OSTEOPENIA) [733.90] 2011    FRAX 9.2/1.2%    Anxiety     Headache(784.0) 2011    Hearing disorder 2011    Hearing loss 2011    Insomnia 2011    Melanoma in situ (H)     MS (multiple sclerosis) (H)     PTSD (post-traumatic stress disorder)     related to dx MS    Recurrent major depression (H24)     Recurrent major depression in complete remission (H24)     Urinary frequency 2011    Vision abnormalities 2011    Wears glasses 2011      Past Surgical History:   Procedure Laterality Date    RETINAL REATTACHMENT      TONSILLECTOMY        No Known Allergies   Social History     Tobacco Use    Smoking status: Former     Current packs/day: 0.00     Types: Cigarettes     Start date: 9/10/1966     Quit date: 1995     Years since quittin.2    Smokeless tobacco: Never    Tobacco comments:     intermittent smoker, no specific pattern   Substance Use Topics    Alcohol use: No     Comment: Recovering alcoholic for 23 years      Wt Readings from Last 1 Encounters:   18 61 kg (134 lb 8 oz)        Anesthesia Evaluation            ROS/MED HX  ENT/Pulmonary:  - neg pulmonary ROS     Neurologic:     (+)                   Multiple Sclerosis,             Cardiovascular:  - neg cardiovascular ROS     METS/Exercise Tolerance: >4 METS    Hematologic:  - neg hematologic  ROS     Musculoskeletal:  - neg musculoskeletal ROS     GI/Hepatic:     (+) GERD,                   Renal/Genitourinary:  - neg Renal ROS     Endo:  - neg endo ROS     Psychiatric/Substance Use:     (+) psychiatric history depression and anxiety       Infectious Disease:  - neg infectious disease ROS     Malignancy:  - neg malignancy ROS     Other:  - neg  "other ROS          Physical Exam    Airway  airway exam normal      Mallampati: I   TM distance: > 3 FB   Neck ROM: full   Mouth opening: > 3 cm    Respiratory Devices and Support         Dental       (+) Minor Abnormalities - some fillings, tiny chips      Cardiovascular   cardiovascular exam normal       Rhythm and rate: regular and normal     Pulmonary   pulmonary exam normal        breath sounds clear to auscultation           OUTSIDE LABS:  CBC:   Lab Results   Component Value Date    WBC 4.2 09/22/2016    WBC 5.1 01/25/2012    HGB 12.0 09/22/2016    HGB 12.9 07/28/2014    HCT 35.7 09/22/2016    HCT 37.4 01/25/2012     09/22/2016     01/25/2012     BMP:   Lab Results   Component Value Date     09/22/2016     04/12/2013    POTASSIUM 4.3 09/22/2016    POTASSIUM 4.0 04/12/2013    CHLORIDE 105 09/22/2016    CHLORIDE 103 04/12/2013    CO2 30 09/22/2016    CO2 27 04/12/2013    BUN 11 09/22/2016    BUN 16 04/12/2013    CR 0.70 09/22/2016    CR 0.79 04/12/2013     (H) 09/22/2016    GLC 79 04/14/2015     COAGS: No results found for: \"PTT\", \"INR\", \"FIBR\"  POC: No results found for: \"BGM\", \"HCG\", \"HCGS\"  HEPATIC:   Lab Results   Component Value Date    ALBUMIN 4.0 09/22/2016    PROTTOTAL 7.1 09/22/2016    ALT 25 09/22/2016    AST 17 09/22/2016    ALKPHOS 85 09/22/2016    BILITOTAL 0.3 09/22/2016     OTHER:   Lab Results   Component Value Date    HANS 9.1 09/22/2016    TSH 1.94 09/22/2016    T4 0.90 09/18/2008       Anesthesia Plan    ASA Status:  2    NPO Status:  NPO Appropriate    Anesthesia Type: MAC.     - Reason for MAC: straight local not clinically adequate   Induction: N/a.   Maintenance: N/A.        Consents    Anesthesia Plan(s) and associated risks, benefits, and realistic alternatives discussed. Questions answered and patient/representative(s) expressed understanding.     - Discussed:     - Discussed with:  Patient       Use of blood products discussed: No .     Postoperative " Care    Pain management: Multi-modal analgesia.   PONV prophylaxis: Ondansetron (or other 5HT-3)     Comments:           H&P reviewed: Unable to attach H&P to encounter due to EHR limitations. H&P Update: appropriate H&P reviewed, patient examined. No interval changes since H&P (within 30 days).        Evan Casey, DO    I have reviewed the pertinent notes and labs in the chart from the past 30 days and (re)examined the patient.  Any updates or changes from those notes are reflected in this note.

## 2024-04-23 ENCOUNTER — ANESTHESIA (OUTPATIENT)
Dept: SURGERY | Facility: AMBULATORY SURGERY CENTER | Age: 74
End: 2024-04-23
Payer: COMMERCIAL

## 2024-04-23 ENCOUNTER — HOSPITAL ENCOUNTER (OUTPATIENT)
Facility: AMBULATORY SURGERY CENTER | Age: 74
Discharge: HOME | End: 2024-04-23
Attending: OPHTHALMOLOGY
Payer: COMMERCIAL

## 2024-04-23 VITALS
HEIGHT: 63 IN | WEIGHT: 133 LBS | SYSTOLIC BLOOD PRESSURE: 121 MMHG | OXYGEN SATURATION: 98 % | RESPIRATION RATE: 16 BRPM | BODY MASS INDEX: 23.57 KG/M2 | TEMPERATURE: 97.6 F | DIASTOLIC BLOOD PRESSURE: 71 MMHG | HEART RATE: 74 BPM

## 2024-04-23 DIAGNOSIS — H25.13 NUCLEAR SCLEROSIS OF BOTH EYES: Primary | ICD-10-CM

## 2024-04-23 PROCEDURE — 66982 XCAPSL CTRC RMVL CPLX WO ECP: CPT | Performed by: NURSE ANESTHETIST, CERTIFIED REGISTERED

## 2024-04-23 PROCEDURE — 66984 XCAPSL CTRC RMVL W/O ECP: CPT | Mod: RT

## 2024-04-23 PROCEDURE — 66984 XCAPSL CTRC RMVL W/O ECP: CPT | Mod: RT | Performed by: OPHTHALMOLOGY

## 2024-04-23 PROCEDURE — 99100 ANES PT EXTEME AGE<1 YR&>70: CPT | Performed by: ANESTHESIOLOGY

## 2024-04-23 PROCEDURE — 99100 ANES PT EXTEME AGE<1 YR&>70: CPT | Performed by: NURSE ANESTHETIST, CERTIFIED REGISTERED

## 2024-04-23 PROCEDURE — 66982 XCAPSL CTRC RMVL CPLX WO ECP: CPT | Performed by: ANESTHESIOLOGY

## 2024-04-23 DEVICE — LENS CC60WF 16.5 CLAREON UV ASPHERIC BICONVEX IOL: Type: IMPLANTABLE DEVICE | Site: EYE | Status: FUNCTIONAL

## 2024-04-23 RX ORDER — ONDANSETRON 2 MG/ML
4 INJECTION INTRAMUSCULAR; INTRAVENOUS EVERY 30 MIN PRN
Status: DISCONTINUED | OUTPATIENT
Start: 2024-04-23 | End: 2024-04-23 | Stop reason: HOSPADM

## 2024-04-23 RX ORDER — ONDANSETRON 4 MG/1
4 TABLET, ORALLY DISINTEGRATING ORAL EVERY 30 MIN PRN
Status: DISCONTINUED | OUTPATIENT
Start: 2024-04-23 | End: 2024-04-23 | Stop reason: HOSPADM

## 2024-04-23 RX ORDER — SODIUM CHLORIDE, SODIUM LACTATE, POTASSIUM CHLORIDE, CALCIUM CHLORIDE 600; 310; 30; 20 MG/100ML; MG/100ML; MG/100ML; MG/100ML
INJECTION, SOLUTION INTRAVENOUS CONTINUOUS
Status: DISCONTINUED | OUTPATIENT
Start: 2024-04-23 | End: 2024-04-23 | Stop reason: HOSPADM

## 2024-04-23 RX ORDER — OXYCODONE HYDROCHLORIDE 5 MG/1
10 TABLET ORAL
Status: DISCONTINUED | OUTPATIENT
Start: 2024-04-23 | End: 2024-04-24 | Stop reason: HOSPADM

## 2024-04-23 RX ORDER — ACETAMINOPHEN 325 MG/1
975 TABLET ORAL ONCE
Status: COMPLETED | OUTPATIENT
Start: 2024-04-23 | End: 2024-04-23

## 2024-04-23 RX ORDER — NALOXONE HYDROCHLORIDE 0.4 MG/ML
0.1 INJECTION, SOLUTION INTRAMUSCULAR; INTRAVENOUS; SUBCUTANEOUS
Status: DISCONTINUED | OUTPATIENT
Start: 2024-04-23 | End: 2024-04-23 | Stop reason: HOSPADM

## 2024-04-23 RX ORDER — SODIUM CHLORIDE, SODIUM LACTATE, POTASSIUM CHLORIDE, CALCIUM CHLORIDE 600; 310; 30; 20 MG/100ML; MG/100ML; MG/100ML; MG/100ML
INJECTION, SOLUTION INTRAVENOUS CONTINUOUS PRN
Status: DISCONTINUED | OUTPATIENT
Start: 2024-04-23 | End: 2024-04-23

## 2024-04-23 RX ORDER — TETRACAINE HYDROCHLORIDE 5 MG/ML
SOLUTION OPHTHALMIC PRN
Status: DISCONTINUED | OUTPATIENT
Start: 2024-04-23 | End: 2024-04-23 | Stop reason: HOSPADM

## 2024-04-23 RX ORDER — PROPARACAINE HYDROCHLORIDE 5 MG/ML
1 SOLUTION/ DROPS OPHTHALMIC ONCE
Status: COMPLETED | OUTPATIENT
Start: 2024-04-23 | End: 2024-04-23

## 2024-04-23 RX ORDER — KETOROLAC TROMETHAMINE 5 MG/ML
1 SOLUTION OPHTHALMIC 4 TIMES DAILY
Qty: 5 ML | Refills: 0 | Status: SHIPPED | OUTPATIENT
Start: 2024-04-23

## 2024-04-23 RX ORDER — LABETALOL HYDROCHLORIDE 5 MG/ML
10 INJECTION, SOLUTION INTRAVENOUS ONCE
Status: COMPLETED | OUTPATIENT
Start: 2024-04-23 | End: 2024-04-23

## 2024-04-23 RX ORDER — HYDROMORPHONE HYDROCHLORIDE 1 MG/ML
0.4 INJECTION, SOLUTION INTRAMUSCULAR; INTRAVENOUS; SUBCUTANEOUS EVERY 5 MIN PRN
Status: DISCONTINUED | OUTPATIENT
Start: 2024-04-23 | End: 2024-04-23 | Stop reason: HOSPADM

## 2024-04-23 RX ORDER — CYCLOPENTOLAT/TROPIC/PHENYLEPH 1%-1%-2.5%
1 DROPS (EA) OPHTHALMIC (EYE)
Status: DISCONTINUED | OUTPATIENT
Start: 2024-04-23 | End: 2024-04-23 | Stop reason: HOSPADM

## 2024-04-23 RX ORDER — OXYCODONE HYDROCHLORIDE 5 MG/1
5 TABLET ORAL
Status: DISCONTINUED | OUTPATIENT
Start: 2024-04-23 | End: 2024-04-24 | Stop reason: HOSPADM

## 2024-04-23 RX ORDER — DICLOFENAC SODIUM 1 MG/ML
1 SOLUTION/ DROPS OPHTHALMIC
Status: COMPLETED | OUTPATIENT
Start: 2024-04-23 | End: 2024-04-23

## 2024-04-23 RX ORDER — CYCLOPENTOLAT/TROPIC/PHENYLEPH 1%-1%-2.5%
1 DROPS (EA) OPHTHALMIC (EYE)
Status: COMPLETED | OUTPATIENT
Start: 2024-04-23 | End: 2024-04-23

## 2024-04-23 RX ORDER — LIDOCAINE HYDROCHLORIDE 20 MG/ML
INJECTION, SOLUTION INFILTRATION; PERINEURAL PRN
Status: DISCONTINUED | OUTPATIENT
Start: 2024-04-23 | End: 2024-04-23

## 2024-04-23 RX ORDER — ONDANSETRON 2 MG/ML
4 INJECTION INTRAMUSCULAR; INTRAVENOUS EVERY 30 MIN PRN
Status: DISCONTINUED | OUTPATIENT
Start: 2024-04-23 | End: 2024-04-24 | Stop reason: HOSPADM

## 2024-04-23 RX ORDER — GLYCOPYRROLATE 0.2 MG/ML
INJECTION, SOLUTION INTRAMUSCULAR; INTRAVENOUS PRN
Status: DISCONTINUED | OUTPATIENT
Start: 2024-04-23 | End: 2024-04-23

## 2024-04-23 RX ORDER — HYDROMORPHONE HYDROCHLORIDE 1 MG/ML
0.2 INJECTION, SOLUTION INTRAMUSCULAR; INTRAVENOUS; SUBCUTANEOUS EVERY 5 MIN PRN
Status: DISCONTINUED | OUTPATIENT
Start: 2024-04-23 | End: 2024-04-23 | Stop reason: HOSPADM

## 2024-04-23 RX ORDER — PROPARACAINE HYDROCHLORIDE 5 MG/ML
1 SOLUTION/ DROPS OPHTHALMIC ONCE
Status: DISCONTINUED | OUTPATIENT
Start: 2024-04-23 | End: 2024-04-23 | Stop reason: HOSPADM

## 2024-04-23 RX ORDER — BALANCED SALT SOLUTION 6.4; .75; .48; .3; 3.9; 1.7 MG/ML; MG/ML; MG/ML; MG/ML; MG/ML; MG/ML
SOLUTION OPHTHALMIC PRN
Status: DISCONTINUED | OUTPATIENT
Start: 2024-04-23 | End: 2024-04-23 | Stop reason: HOSPADM

## 2024-04-23 RX ORDER — PROPOFOL 10 MG/ML
INJECTION, EMULSION INTRAVENOUS PRN
Status: DISCONTINUED | OUTPATIENT
Start: 2024-04-23 | End: 2024-04-23

## 2024-04-23 RX ORDER — MOXIFLOXACIN 5 MG/ML
1 SOLUTION/ DROPS OPHTHALMIC
Status: COMPLETED | OUTPATIENT
Start: 2024-04-23 | End: 2024-04-23

## 2024-04-23 RX ORDER — NALOXONE HYDROCHLORIDE 0.4 MG/ML
0.1 INJECTION, SOLUTION INTRAMUSCULAR; INTRAVENOUS; SUBCUTANEOUS
Status: DISCONTINUED | OUTPATIENT
Start: 2024-04-23 | End: 2024-04-24 | Stop reason: HOSPADM

## 2024-04-23 RX ORDER — FENTANYL CITRATE 50 UG/ML
50 INJECTION, SOLUTION INTRAMUSCULAR; INTRAVENOUS EVERY 5 MIN PRN
Status: DISCONTINUED | OUTPATIENT
Start: 2024-04-23 | End: 2024-04-23 | Stop reason: HOSPADM

## 2024-04-23 RX ORDER — DEXAMETHASONE SODIUM PHOSPHATE 4 MG/ML
INJECTION, SOLUTION INTRA-ARTICULAR; INTRALESIONAL; INTRAMUSCULAR; INTRAVENOUS; SOFT TISSUE PRN
Status: DISCONTINUED | OUTPATIENT
Start: 2024-04-23 | End: 2024-04-23 | Stop reason: HOSPADM

## 2024-04-23 RX ORDER — LIDOCAINE 40 MG/G
CREAM TOPICAL
Status: DISCONTINUED | OUTPATIENT
Start: 2024-04-23 | End: 2024-04-23 | Stop reason: HOSPADM

## 2024-04-23 RX ORDER — ONDANSETRON 4 MG/1
4 TABLET, ORALLY DISINTEGRATING ORAL EVERY 30 MIN PRN
Status: DISCONTINUED | OUTPATIENT
Start: 2024-04-23 | End: 2024-04-24 | Stop reason: HOSPADM

## 2024-04-23 RX ORDER — FENTANYL CITRATE 50 UG/ML
25 INJECTION, SOLUTION INTRAMUSCULAR; INTRAVENOUS EVERY 5 MIN PRN
Status: DISCONTINUED | OUTPATIENT
Start: 2024-04-23 | End: 2024-04-23 | Stop reason: HOSPADM

## 2024-04-23 RX ORDER — PROPOFOL 10 MG/ML
INJECTION, EMULSION INTRAVENOUS CONTINUOUS PRN
Status: DISCONTINUED | OUTPATIENT
Start: 2024-04-23 | End: 2024-04-23

## 2024-04-23 RX ORDER — ACETAMINOPHEN 325 MG/1
325-650 TABLET ORAL EVERY 6 HOURS PRN
COMMUNITY

## 2024-04-23 RX ORDER — PREDNISOLONE ACETATE 10 MG/ML
1 SUSPENSION/ DROPS OPHTHALMIC 4 TIMES DAILY
Qty: 5 ML | Refills: 0 | Status: SHIPPED | OUTPATIENT
Start: 2024-04-23

## 2024-04-23 RX ORDER — MOXIFLOXACIN 5 MG/ML
1 SOLUTION/ DROPS OPHTHALMIC 4 TIMES DAILY
Qty: 3 ML | Refills: 0 | Status: SHIPPED | OUTPATIENT
Start: 2024-04-23

## 2024-04-23 RX ORDER — SODIUM CHLORIDE, SODIUM LACTATE, POTASSIUM CHLORIDE, CALCIUM CHLORIDE 600; 310; 30; 20 MG/100ML; MG/100ML; MG/100ML; MG/100ML
INJECTION, SOLUTION INTRAVENOUS CONTINUOUS
Status: DISCONTINUED | OUTPATIENT
Start: 2024-04-23 | End: 2024-04-24 | Stop reason: HOSPADM

## 2024-04-23 RX ADMIN — MOXIFLOXACIN 1 DROP: 5 SOLUTION/ DROPS OPHTHALMIC at 07:15

## 2024-04-23 RX ADMIN — GLYCOPYRROLATE 0.2 MG: 0.2 INJECTION, SOLUTION INTRAMUSCULAR; INTRAVENOUS at 08:28

## 2024-04-23 RX ADMIN — PROPOFOL 130 MG: 10 INJECTION, EMULSION INTRAVENOUS at 08:11

## 2024-04-23 RX ADMIN — PROPOFOL 30 MG: 10 INJECTION, EMULSION INTRAVENOUS at 08:14

## 2024-04-23 RX ADMIN — PROPARACAINE HYDROCHLORIDE 1 DROP: 5 SOLUTION/ DROPS OPHTHALMIC at 06:59

## 2024-04-23 RX ADMIN — PROPOFOL 200 MCG/KG/MIN: 10 INJECTION, EMULSION INTRAVENOUS at 08:13

## 2024-04-23 RX ADMIN — LABETALOL HYDROCHLORIDE 10 MG: 5 INJECTION, SOLUTION INTRAVENOUS at 09:42

## 2024-04-23 RX ADMIN — ACETAMINOPHEN 975 MG: 325 TABLET ORAL at 06:58

## 2024-04-23 RX ADMIN — DICLOFENAC SODIUM 1 DROP: 1 SOLUTION/ DROPS OPHTHALMIC at 07:15

## 2024-04-23 RX ADMIN — MOXIFLOXACIN 1 DROP: 5 SOLUTION/ DROPS OPHTHALMIC at 07:07

## 2024-04-23 RX ADMIN — Medication 1 DROP: at 06:59

## 2024-04-23 RX ADMIN — Medication 1 DROP: at 07:07

## 2024-04-23 RX ADMIN — Medication 1 DROP: at 07:15

## 2024-04-23 RX ADMIN — DICLOFENAC SODIUM 1 DROP: 1 SOLUTION/ DROPS OPHTHALMIC at 06:59

## 2024-04-23 RX ADMIN — SODIUM CHLORIDE, SODIUM LACTATE, POTASSIUM CHLORIDE, CALCIUM CHLORIDE: 600; 310; 30; 20 INJECTION, SOLUTION INTRAVENOUS at 07:50

## 2024-04-23 RX ADMIN — LIDOCAINE HYDROCHLORIDE 60 MG: 20 INJECTION, SOLUTION INFILTRATION; PERINEURAL at 08:11

## 2024-04-23 RX ADMIN — MOXIFLOXACIN 1 DROP: 5 SOLUTION/ DROPS OPHTHALMIC at 06:59

## 2024-04-23 RX ADMIN — DICLOFENAC SODIUM 1 DROP: 1 SOLUTION/ DROPS OPHTHALMIC at 07:07

## 2024-04-23 NOTE — ANESTHESIA POSTPROCEDURE EVALUATION
Patient: Martha Hayden    Procedure: Procedure(s):  RIGHT EYE PHACOEMULSIFICATION, CATARACT, WITH INTRAOCULAR LENS IMPLANT       Anesthesia Type:  MAC    Note:  Disposition: Outpatient   Postop Pain Control: Uneventful            Sign Out: Well controlled pain   PONV: No   Neuro/Psych: Uneventful            Sign Out: Acceptable/Baseline neuro status   Airway/Respiratory: Uneventful            Sign Out: Acceptable/Baseline resp. status   CV/Hemodynamics: Uneventful            Sign Out: Acceptable CV status; No obvious hypovolemia; No obvious fluid overload   Other NRE: NONE   DID A NON-ROUTINE EVENT OCCUR? No           Last vitals:  Vitals Value Taken Time   /75 04/23/24 0900   Temp 36.4  C (97.6  F) 04/23/24 0900   Pulse 93 04/23/24 0900   Resp 21 04/23/24 0900   SpO2 96 % 04/23/24 0900       Electronically Signed By: Evan Casey DO  April 23, 2024  9:29 AM

## 2024-04-23 NOTE — ANESTHESIA CARE TRANSFER NOTE
Patient: Martha Hayden    Procedure: Procedure(s):  RIGHT EYE PHACOEMULSIFICATION, CATARACT, WITH INTRAOCULAR LENS IMPLANT       Diagnosis: Nuclear sclerosis of both eyes [H25.13]  Diagnosis Additional Information: No value filed.    Anesthesia Type:   MAC     Note:    Oropharynx: oropharynx clear of all foreign objects  Level of Consciousness: awake  Oxygen Supplementation: face mask and room air    Independent Airway: airway patency satisfactory and stable  Dentition: dentition unchanged  Vital Signs Stable: post-procedure vital signs reviewed and stable  Report to RN Given: handoff report given  Patient transferred to: PACU  Comments: Resps easy and reg. Report to PACU RN   Handoff Report: Identifed the Patient, Identified the Reponsible Provider, Reviewed the pertinent medical history, Discussed the surgical course, Reviewed Intra-OP anesthesia mangement and issues during anesthesia, Set expectations for post-procedure period and Allowed opportunity for questions and acknowledgement of understanding  Vitals:  Vitals Value Taken Time   /79 04/23/24 0853   Temp     Pulse 93 04/23/24 0857   Resp 14 04/23/24 0857   SpO2 96 % 04/23/24 0857   Vitals shown include unfiled device data.    Electronically Signed By: RUBÉN Golden CRNA  April 23, 2024  8:58 AM

## 2024-04-23 NOTE — DISCHARGE INSTRUCTIONS
[facility]  Home Care Following Anesthesia  For 24 hours after surgery:  Get plenty of rest.  A responsible adult must stay with you for at least 24 hours after you leave the surgery center.  Do not drive or use heavy equipment.  If you have weakness or tingling, don't drive or use heavy equipment until this feeling goes away.   Do not drink alcohol.   Avoid strenuous or risky activities.  Ask for help when climbing stairs.  You may feel lightheaded.  IF so, sit for a few minutes before standing.  Have someone help you get up.   If you have nausea (feel sick to your stomach): Drink only clear liquids such as apple juice, ginger ale, broth or 7-Up.  Rest may also help.  Be sure to drink enough fluids.  Move to a regular diet as you feel able.   You may have a slight fever.  Call the doctor if your fever is over 100 F (37.7 C) (taken under the tongue) or lasts longer than 24 hours.  You may have a dry mouth, a sore throat, muscle aches or trouble sleeping. These should go away after 24 hours.  Do not make important or legal decisions.   It is recommended to avoid smoking.               Tips for taking pain medications  To get the best pain relief possible, remember these points:  Take pain medications as directed, before pain becomes severe.  Pain medication can upset your stomach: taking it with food may help.  Constipation is a common side effect of pain medication. Drink plenty of  fluids.  Eat foods high in fiber. Take a stool softener if recommended by your doctor or pharmacist.  Do not drink alcohol, drive or operate machinery while taking pain medications.  Ask about other ways to control pain, such as with heat, ice or relaxation.    Tylenol/Acetaminophen Consumption    If you feel your pain relief is insufficient, you may take Tylenol/Acetaminophen in addition to your narcotic pain medication.   Be careful not to exceed 4,000 mg of Tylenol/Acetaminophen in a 24 hour  period from all sources.  If you are taking extra strength Tylenol/acetaminophen (500 mg), the maximum dose is 8 tablets in 24 hours.  If you are taking regular strength acetaminophen (325 mg), the maximum dose is 12 tablets in 24 hours.    Call a doctor for any of the following:  Signs of infection (fever, growing tenderness at the surgery site, a large amount of drainage or bleeding, severe pain, foul-smelling drainage, redness, swelling).  It has been over 8 to 10 hours since surgery and you are still not able to urinate (pass water).  Headache for over 24 hours.  Numbness, tingling or weakness the day after surgery (if you had spinal anesthesia).  Signs of Covid-19 infection (temperature over 100 degrees, shortness of breath, cough, loss of taste/smell, generalized body aches, persistent headache, chills, sore throat, nausea/vomiting/diarrhea)  Your doctor is:       Dr. Dominique Augustine, Ophthalmology: [phone]               Or dial [phone] and ask for the resident on call for:  Ophthalmology  For emergency care, call the:  [facility]:  [phone] (TTY for hearing impaired: [phone])

## 2024-04-23 NOTE — OP NOTE
SURGEON:  LINDA COLVIN   Assistant surgeon: Art Jara MD  PREOPERATIVE DIAGNOSIS:  visually significant nucleosclerotic cataract right eye   POSTOPERATIVE DIAGNOSIS: same  NAME OF THE PROCEDURE: Phacoemulsification with intraocular lens implantation right eye   ANESTHESIA: General anesthesia and peribulbar block   COMPLICATIONS: none  INDICATIONS: Martha Hayden is a 73 year old with diagnosis of visually significant cataract, here for cataract surgery    DESCRIPTION OF THE PROCEDURE:  The patient was taken to the operative room where general anesthesia was administered and a peribulbar block consisting of a 1:1 mixture of 2%lidocaine and 0.75% marcaine with epinephrine and wydase, was administered to the operative eye with adequate anesthesia and akinesia.    The operative eye was prepped and draped in the usual sterile surgical fashion for ophthalmic surgery, including the installation of one drop of 5% Povidone Iodine.  A sterile drape was placed over the face and body and a lid speculum was inserted.      With the use of a Supersharp blade and 0.12 forceps, a paracentesis was created at the 2 o'clock position, and viscoelastic was injected into the anterior chamber using a canula.  A 2.5 mm keratome was then used to construct a clear corneal incision at the 10 o'clock position.  Using Utrata forceps and cystotome needle, a continuous curvilinear capsulorrhexis was created and hydrodissection was undertaken with the use of BSS.  The nucleus was found to be freely mobile and then removed by phacoemulsification using a divide and conquer technique.  The remaining elements of cortex were then removed with irrigation/aspiration.  An IOL,was injected into the capsular bag and was rotated into a good position with a Sinskey hook. The remaining elements of viscoelastic were then removed with irrigation/aspiration. The wounds were hydrated and were watertight. The lid speculum was removed.  Subconjunctival  injection of Dexamethasone and Ancef were administered. The eye was cleaned with wet and dry gauze. Maxitrol ointment was placed on the eye.  A patch and  shield were placed over the eye.  The patient was discharge in stable condition having tolerated the procedure well      Implant Name Type Inv. Item Serial No.  Lot No. LRB No. Used Action   LENS CC60WF 16.5 CL[employer] UV ASPHERIC BICONVEX IOL - [id number] Lens/Eye Implant LENS CC60WF 16.5 CLAREON UV ASPHERIC BICONVEX IOL [id number] [employer]  Right 1 Implanted     The surgery was assisted by Dr. Art Jara. Due to the delicate and complex nature of this surgery, an assistant was required and No qualified resident was available. He assisted with Cataract extraction. I was present for the entire surgery.

## 2024-04-23 NOTE — ANESTHESIA PROCEDURE NOTES
Airway       Patient location during procedure: OR  Staff -        CRNA: Roxy Gibson APRN CRNA       Performed By: CRNA  Consent for Airway        Urgency: elective  Indications and Patient Condition       Indications for airway management: corinna-procedural        Final Airway Details       Final airway type: supraglottic airway    Supraglottic Airway Details        Type: LMA       Brand: LMA Unique       LMA size: 4    Post intubation assessment        Placement verified by: capnometry, equal breath sounds and chest rise        Number of attempts at approach: 1       Secured with: tape       Ease of procedure: easy       Dentition: Intact and Unchanged

## 2024-04-23 NOTE — BRIEF OP NOTE
[facility]    Brief Operative Note    Pre-operative diagnosis: Nuclear sclerosis of both eyes [H25.13]  Post-operative diagnosis Same as pre-operative diagnosis    Procedure: RIGHT EYE PHACOEMULSIFICATION, CATARACT, WITH INTRAOCULAR LENS IMPLANT, Right - Eye    Surgeon: Surgeons and Role:     * Dominique Augustine MD - Primary     * Art Celis MD - Fellow - Assisting  Anesthesia: General with Block   Estimated Blood Loss: 0 mL from 4/23/2024  8:06 AM to 4/23/2024  8:51 AM      Drains: None  Specimens: * No specimens in log *  Findings:   None.  Complications: None.  Implants:   Implant Name Type Inv. Item Serial No.  Lot No. LRB No. Used Action   LENS CC60WF 16.5 CLAREON UV ASPHERIC BICONVEX IOL - [id number] Lens/Eye Implant LENS CC60WF 16.5 CLAREON UV ASPHERIC BICONVEX IOL [id number] [employer]  Right 1 Implanted

## 2024-04-24 ENCOUNTER — OFFICE VISIT (OUTPATIENT)
Dept: OPHTHALMOLOGY | Facility: CLINIC | Age: 74
End: 2024-04-24
Attending: OPHTHALMOLOGY
Payer: COMMERCIAL

## 2024-04-24 DIAGNOSIS — Z48.810 AFTERCARE FOLLOWING SURGERY OF A SENSORY ORGAN: Primary | ICD-10-CM

## 2024-04-24 PROCEDURE — 99024 POSTOP FOLLOW-UP VISIT: CPT | Performed by: OPHTHALMOLOGY

## 2024-04-24 PROCEDURE — G0463 HOSPITAL OUTPT CLINIC VISIT: HCPCS | Performed by: OPHTHALMOLOGY

## 2024-04-24 ASSESSMENT — REFRACTION_WEARINGRX
OD_CYLINDER: SPHERE
OS_AXIS: 010
OD_SPHERE: -2.75
OS_CYLINDER: +1.00
OS_SPHERE: -5.75

## 2024-04-24 ASSESSMENT — TONOMETRY
IOP_METHOD: TONOPEN
OD_IOP_MMHG: 20
OS_IOP_MMHG: 16

## 2024-04-24 ASSESSMENT — VISUAL ACUITY
OS_CC+: -1
OD_CC: 20/40
METHOD: SNELLEN - LINEAR
OS_CC: 20/30
OD_SC: 20/150

## 2024-04-24 ASSESSMENT — SLIT LAMP EXAM - LIDS
COMMENTS: NORMAL
COMMENTS: NORMAL

## 2024-04-24 NOTE — PATIENT INSTRUCTIONS
"  Ketorolac (gray top) four times a day    Predforte  (pink top) four times a day  (shake the bottle before)  Ofloxacin (tan top) four times a day    Put the eyedrops 5 minutes a part  Eye shield or glasses at all times x 3 weeks  Sleep with the shield  No heavy lifting   Follow-up in one week  Retina detachment and endophthalmitis precautions were discussed with the patient (increased blurry vision, drainage, new flashes, floaters or a curtain in the visual field) and was asked to return if any of the those occur    What to watch out for:  If you experience any of the following \"RSVP Symptoms\", you should call immediately:  Worsening Redness  Worsening Sensitivity to light  Worsening Vision, including new flashing lights or floaters  Worsening Pain, including nausea/vomiting  "

## 2024-04-24 NOTE — PROGRESS NOTES
"   CC: #POD1 Cataract extraction intraocular lens right eye     Interval history: no eye pain; no flashes and floaters     Previously referred by: from a patient of [facility] - Ms Jara  HPI: Martha Hayden is a 73 year old year-old patient with history of Age related macular degeneration and Cataracts. She says her glasses are current but she does not see as well as she used to, and no longer drives at night because of  glare. Has noticed more difficulty reading without her glasses close up as well as driving at night. She has stopped driving at night due to glare. She thinks she might be ready to move forward with cataract surgery.    PMH: MM in situ, anxiety, headache , insomnia, esophagitis; low back pain  Past ocular history: History of ocular migraines, and retinal tear.     Retinal Imaging:  Optical Coherence Tomography 1/25/24  Right eye with Epiretinal membrane; and cystic changes   Left eye: drusen and Retinal pigment epithelium changes in both eyes; no subretinal fluid; trace Epiretinal membrane     Fundus photos consistent with exam 09/07/23   Autofluorescence: macula hyper and hypoautofluorescent spots consistent with drusen    Assessment & Plan:    #POD1 Cataract extraction intraocular lens right eye     Ketorolac (gray top) four times a day    Predforte  (pink top) four times a day  (shake the bottle before)  Ofloxacin (tan top) four times a day    Put the eyedrops 5 minutes a part  Eye shield or glasses at all times x 3 weeks  Sleep with the shield  No heavy lifting   Follow-up in one week  Retina detachment and endophthalmitis precautions were discussed with the patient (increased blurry vision, drainage, new flashes, floaters or a curtain in the visual field) and was asked to return if any of the those occur    What to watch out for:  If you experience any of the following \"RSVP Symptoms\", you should call immediately:  Worsening Redness  Worsening Sensitivity to light  Worsening Vision, including new " flashing lights or floaters  Worsening Pain, including nausea/vomiting    # Dry Age related macular degeneration both eyes   AREDS supplementation is recommended.  Weekly Amsler Grid use was discussed and training performed.  A diet of leafy green vegetables was encouraged.  Return precautions were given.    # Posterior vitreous detachment (PVD) both eyes   Retina detachment precautions were discussed with the patient (presence or increased in flashes, floaters or a curtain in the visual field) and was asked to return if any of the those occur     # Cataract left eye   Becoming visually significant   BATs to 20/100, 20/125  Discussed with patient cataract surgery with refractive correction of myopia. 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    Plan for Cataract extraction intraocular lens left eye   The cataract is visually significant, Reports impacts ADL and has glare     Surgeon procedure time:  45 min  Urgency of Surgery: Routine  Post-op apps needed: 1day; 1 week; 3 weeks  Multi surgeon case: No   H&P completed by primary care physician/PAC   needed: NO  Anesthesia: Peribulbar block and general anesthesia  Patient with history of claustrophobia and PTSD    Aim for: -3.0   Patient interested in reading   Dilation:Good  Iris expansion: Not needed  Epinephrine: No  Pseudoexfoliation: NO  Trypan Blue: No   Phacodonesis: No  Cornea guttae: rare  Anticoagulants: NO  Flomax: NO  Candidate for multifocal or toric IOL: possible   Visually significant astigmatism: Discussed elective surgical refractive corrective options  Prior refractive surgery: No  Patient requesting attending only    I reviewed the indications, risks, benefits, and alternatives of the proposed surgical procedure. We discussed at length cataracts and the effect of the cataracts on vision.   We discussed the fact that modern cataract surgery is usually successful at alleviating symptoms of glare when the cataract is the  causative factor. Other risks were discussed with patient including, but not limited to, failure to improve vision or further loss of vision, bleeding, infection, loss of vision and the remote possibility of complications of anesthesia or need for additional surgery. 1:1000 risk of infection/bleed/loss of eye; 1:100 risk of RD and need for further surgery.  Patient agreed to proceed with surgery.  I provided multiple opportunities for the questions, answered all questions to the best of my ability, and confirmed that my answers and my discussion were understood.     # history of retina tear right eye   # VR adhesion left eye   Retina attached both eyes on scleral depression      # Epiretinal membrane right eye   With pseudohole and cystic changes   Observe    #myopia  Retina detachment precautions were discussed with the patient (presence or increased in flashes, floaters or a curtain in the visual field) and was asked to return if any of the those occur     Patient wants surgery in spring 2024     ~~~~~~~~~~~~~~~~~~~~~~~~~~~~~~~~~~   Complete documentation of historical and exam elements from today's encounter can be found in the full encounter summary report (not reduplicated in this progress note).  I personally obtained the chief complaint(s) and history of present illness.  I confirmed and edited as necessary the review of systems, past medical/surgical history, family history, social history, and examination findings as documented by others; and I examined the patient myself.  I personally reviewed the relevant tests, images, and reports as documented above.  I formulated and edited as necessary the assessment and plan and discussed the findings and management plan with the patient and family    Dominique Augustine MD  Professor of Ophthalmology.   Vitreo-retinal surgeon   Department of Ophthalmology and Visual Neurosciences   [facility]  Phone: [phone]   Fax: [phone]

## 2024-04-24 NOTE — NURSING NOTE
Chief Complaints and History of Present Illnesses   Patient presents with    Post Op (Ophthalmology) Right Eye     Cataract extraction with IOL in the right eye on 04/23/2024        Chief Complaint(s) and History of Present Illness(es)       Post Op (Ophthalmology) Right Eye              Laterality: right eye    Associated symptoms: redness.  Negative for eye pain, tearing and headache    Pain scale: 0/10    Comments: Cataract extraction with IOL in the right eye on 04/23/2024                 Comments    Patient returns to clinic for a right eye post operative exam.  She states that her right eye is comfortable.  Patch was removed in office.  Her vision seems brighter in her right eye.    Lynn Mcguire, COT 7:52 AM  April 24, 2024

## 2024-04-25 ENCOUNTER — NURSE TRIAGE (OUTPATIENT)
Dept: NURSING | Facility: CLINIC | Age: 74
End: 2024-04-25
Payer: COMMERCIAL

## 2024-04-25 NOTE — TELEPHONE ENCOUNTER
Spoke to pt at 1200    Right eye cataract surgery 2 days ago    Pt noticing something at times in peripheral vision--shimmering    No quick flashing in dim light    No new floaters    No vision changes    Reviewed dysphotopsia following cataract surgery and should resolved over next week or so.    Review to reach out for any new floaters/flashing in dim light/vision changes/eye symptoms    Pt verbally demonstrated understanding    Note to Dr. Augustine for review/amendment if applicable.    Thomas Ho RN 12:08 PM 04/25/24

## 2024-05-01 ENCOUNTER — OFFICE VISIT (OUTPATIENT)
Dept: OPHTHALMOLOGY | Facility: CLINIC | Age: 74
End: 2024-05-01
Attending: OPHTHALMOLOGY
Payer: COMMERCIAL

## 2024-05-01 DIAGNOSIS — Z48.810 AFTERCARE FOLLOWING SURGERY OF A SENSORY ORGAN: Primary | ICD-10-CM

## 2024-05-01 PROCEDURE — 99024 POSTOP FOLLOW-UP VISIT: CPT | Performed by: OPHTHALMOLOGY

## 2024-05-01 PROCEDURE — G0463 HOSPITAL OUTPT CLINIC VISIT: HCPCS | Performed by: OPHTHALMOLOGY

## 2024-05-01 ASSESSMENT — CONF VISUAL FIELD
OS_INFERIOR_NASAL_RESTRICTION: 0
METHOD: COUNTING FINGERS
OS_NORMAL: 1
OD_INFERIOR_NASAL_RESTRICTION: 0
OS_SUPERIOR_NASAL_RESTRICTION: 0
OD_SUPERIOR_TEMPORAL_RESTRICTION: 0
OS_SUPERIOR_TEMPORAL_RESTRICTION: 0
OD_SUPERIOR_NASAL_RESTRICTION: 0
OD_NORMAL: 1
OS_INFERIOR_TEMPORAL_RESTRICTION: 0
OD_INFERIOR_TEMPORAL_RESTRICTION: 0

## 2024-05-01 ASSESSMENT — REFRACTION_WEARINGRX
OD_CYLINDER: SPHERE
OS_CYLINDER: +1.00
OS_AXIS: 010
OS_SPHERE: -5.75
OD_SPHERE: -2.75

## 2024-05-01 ASSESSMENT — VISUAL ACUITY
OD_PH_SC: 20/40
OD_SC: J1+
OS_SC: 20/400
OD_SC: 20/100
OD_PH_SC+: -2
OS_PH_SC: 20/60
METHOD: SNELLEN - LINEAR

## 2024-05-01 ASSESSMENT — SLIT LAMP EXAM - LIDS
COMMENTS: NORMAL
COMMENTS: NORMAL

## 2024-05-01 ASSESSMENT — TONOMETRY
OD_IOP_MMHG: 12
OS_IOP_MMHG: 14
IOP_METHOD: TONOPEN

## 2024-05-01 NOTE — PROGRESS NOTES
"   CC: #POW1 Cataract extraction intraocular lens right eye     Interval history: Reports she had some headache, no eye pain, no irritation or increase in redness    Previously referred by: from a patient of [facility] - Ms Jara  HPI: Martha Hayden is a 73 year old year-old patient with history of Age related macular degeneration and Cataracts. She says her glasses are current but she does not see as well as she used to, and no longer drives at night because of  glare. Has noticed more difficulty reading without her glasses close up as well as driving at night. She has stopped driving at night due to glare. She thinks she might be ready to move forward with cataract surgery.    PMH: MM in situ, anxiety, headache , insomnia, esophagitis; low back pain  Past ocular history: History of ocular migraines, and retinal tear.     Retinal Imaging:  Optical Coherence Tomography 1/25/24  Right eye with Epiretinal membrane; and cystic changes   Left eye: drusen and Retinal pigment epithelium changes in both eyes; no subretinal fluid; trace Epiretinal membrane     Fundus photos consistent with exam 09/07/23   Autofluorescence: macula hyper and hypoautofluorescent spots consistent with drusen    Assessment & Plan:    #POW1 Cataract extraction intraocular lens right eye 4.23.24  Stop Ofloxacin (tan top)  Ketorolac (gray top) and Predforte  (pink top)  Three times a day x 1 week, then twice a day x 1 week and then once a day till finish  (shake the bottle before)  Eye shield or glasses at all times x 3 weeks  Sleep with the shield  No heavy lifting   Follow-up in three weeks  Retina detachment and endophthalmitis precautions were discussed with the patient (increased blurry vision, drainage, new flashes, floaters or a curtain in the visual field) and was asked to return if any of the those occur    What to watch out for:  If you experience any of the following \"RSVP Symptoms\", you should call immediately:  Worsening " Redness  Worsening Sensitivity to light  Worsening Vision, including new flashing lights or floaters  Worsening Pain, including nausea/vomiting    # Dry Age related macular degeneration both eyes   AREDS supplementation is recommended.  Weekly Amsler Grid use was discussed and training performed.  A diet of leafy green vegetables was encouraged.  Return precautions were given.    # Posterior vitreous detachment (PVD) both eyes   Retina detachment precautions were discussed with the patient (presence or increased in flashes, floaters or a curtain in the visual field) and was asked to return if any of the those occur     # Cataract left eye   Becoming visually significant   BATs to 20/100, 20/125  Discussed with patient cataract surgery with refractive correction of myopia. 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    Plan for Cataract extraction intraocular lens left eye   The cataract is visually significant, Reports impacts ADL and has glare     Surgeon procedure time:  45 min  Urgency of Surgery: Routine  Post-op apps needed: 1day; 1 week; 3 weeks  Multi surgeon case: No   H&P completed by primary care physician/PAC   needed: NO  Anesthesia: Peribulbar block and general anesthesia  Patient with history of claustrophobia and PTSD    Aim for: -3.0   Patient interested in reading   Dilation:Good  Iris expansion: Not needed  Epinephrine: No  Pseudoexfoliation: NO  Trypan Blue: No   Phacodonesis: No  Cornea guttae: rare  Anticoagulants: NO  Flomax: NO  Candidate for multifocal or toric IOL: possible   Visually significant astigmatism: Discussed elective surgical refractive corrective options  Prior refractive surgery: No  Patient requesting attending only    I reviewed the indications, risks, benefits, and alternatives of the proposed surgical procedure. We discussed at length cataracts and the effect of the cataracts on vision.   We discussed the fact that modern cataract surgery is usually  successful at alleviating symptoms of glare when the cataract is the causative factor. Other risks were discussed with patient including, but not limited to, failure to improve vision or further loss of vision, bleeding, infection, loss of vision and the remote possibility of complications of anesthesia or need for additional surgery. 1:1000 risk of infection/bleed/loss of eye; 1:100 risk of RD and need for further surgery.  Patient agreed to proceed with surgery.  I provided multiple opportunities for the questions, answered all questions to the best of my ability, and confirmed that my answers and my discussion were understood.     # history of retina tear right eye   # VR adhesion left eye   Retina attached both eyes on scleral depression      # Epiretinal membrane right eye   With pseudohole and cystic changes   Observe    #myopia  Retina detachment precautions were discussed with the patient (presence or increased in flashes, floaters or a curtain in the visual field) and was asked to return if any of the those occur     PLAN:   Follow up in one month  Patient would like to be refracted at POW1 left eye if possible, since she is traveling in 06/01/2024       ~~~~~~~~~~~~~~~~~~~~~~~~~~~~~~~~~~   Complete documentation of historical and exam elements from today's encounter can be found in the full encounter summary report (not reduplicated in this progress note).  I personally obtained the chief complaint(s) and history of present illness.  I confirmed and edited as necessary the review of systems, past medical/surgical history, family history, social history, and examination findings as documented by others; and I examined the patient myself.  I personally reviewed the relevant tests, images, and reports as documented above.  I formulated and edited as necessary the assessment and plan and discussed the findings and management plan with the patient and family    Dominique Augustine MD  Professor of  Ophthalmology.   Vitreo-retinal surgeon   Department of Ophthalmology and Visual Neurosciences   [facility]  Phone: [phone]   Fax: [phone]

## 2024-05-01 NOTE — PATIENT INSTRUCTIONS
"  Stop Ofloxacin (tan top)  Ketorolac (gray top) and Predforte  (pink top)  Three times a day x 1 week, then twice a day x 1 week and then once a day till finish  (shake the bottle before)  Put the eyedrops 5 minutes a part  Eye shield or glasses at all times x 3 weeks  Sleep with the shield  No heavy lifting   Follow-up in three weeks  Retina detachment and endophthalmitis precautions were discussed with the patient (increased blurry vision, drainage, new flashes, floaters or a curtain in the visual field) and was asked to return if any of the those occur    What to watch out for:  If you experience any of the following \"RSVP Symptoms\", you should call immediately:  Worsening Redness  Worsening Sensitivity to light  Worsening Vision, including new flashing lights or floaters  Worsening Pain, including nausea/vomiting  "

## 2024-05-01 NOTE — NURSING NOTE
Chief Complaints and History of Present Illnesses   Patient presents with    Post Op (Ophthalmology) Right Eye     POW#1 s/p CE/IOL RE      Chief Complaint(s) and History of Present Illness(es)       Post Op (Ophthalmology) Right Eye              Comments: POW#1 s/p CE/IOL RE               Comments    Pt uncertain if vision is as sharp as it has been. No eye pain today. Occasional sharp eye pains in each eye, RE> LE.  No new flashes or floaters. Occasional shimmery sensation in RE that comes and goes.    REESE Mehta May 1, 2024 9:32 AM

## 2024-05-06 ENCOUNTER — ANESTHESIA EVENT (OUTPATIENT)
Dept: SURGERY | Facility: AMBULATORY SURGERY CENTER | Age: 74
End: 2024-05-06
Payer: COMMERCIAL

## 2024-05-07 ENCOUNTER — HOSPITAL ENCOUNTER (OUTPATIENT)
Facility: AMBULATORY SURGERY CENTER | Age: 74
Discharge: HOME | End: 2024-05-07
Attending: OPHTHALMOLOGY
Payer: COMMERCIAL

## 2024-05-07 ENCOUNTER — ANESTHESIA (OUTPATIENT)
Dept: SURGERY | Facility: AMBULATORY SURGERY CENTER | Age: 74
End: 2024-05-07
Payer: COMMERCIAL

## 2024-05-07 VITALS
OXYGEN SATURATION: 98 % | BODY MASS INDEX: 23.21 KG/M2 | WEIGHT: 131 LBS | HEIGHT: 63 IN | DIASTOLIC BLOOD PRESSURE: 65 MMHG | TEMPERATURE: 97.5 F | RESPIRATION RATE: 16 BRPM | HEART RATE: 79 BPM | SYSTOLIC BLOOD PRESSURE: 121 MMHG

## 2024-05-07 DIAGNOSIS — H25.12 NUCLEAR SCLEROSIS OF LEFT EYE: Primary | ICD-10-CM

## 2024-05-07 PROCEDURE — 99100 ANES PT EXTEME AGE<1 YR&>70: CPT | Performed by: NURSE ANESTHETIST, CERTIFIED REGISTERED

## 2024-05-07 PROCEDURE — 66982 XCAPSL CTRC RMVL CPLX WO ECP: CPT | Performed by: ANESTHESIOLOGY

## 2024-05-07 PROCEDURE — 66984 XCAPSL CTRC RMVL W/O ECP: CPT | Mod: LT

## 2024-05-07 PROCEDURE — 99100 ANES PT EXTEME AGE<1 YR&>70: CPT | Performed by: ANESTHESIOLOGY

## 2024-05-07 PROCEDURE — 66984 XCAPSL CTRC RMVL W/O ECP: CPT | Mod: 79 | Performed by: OPHTHALMOLOGY

## 2024-05-07 PROCEDURE — 66982 XCAPSL CTRC RMVL CPLX WO ECP: CPT | Performed by: NURSE ANESTHETIST, CERTIFIED REGISTERED

## 2024-05-07 DEVICE — LENS CC60WF 17.0 CLAREON UV ASPHERIC BICONVEX IOL: Type: IMPLANTABLE DEVICE | Site: EYE | Status: FUNCTIONAL

## 2024-05-07 RX ORDER — PREDNISOLONE ACETATE 10 MG/ML
1 SUSPENSION/ DROPS OPHTHALMIC 4 TIMES DAILY
Qty: 5 ML | Refills: 0 | Status: SHIPPED | OUTPATIENT
Start: 2024-05-07

## 2024-05-07 RX ORDER — PROPOFOL 10 MG/ML
INJECTION, EMULSION INTRAVENOUS PRN
Status: DISCONTINUED | OUTPATIENT
Start: 2024-05-07 | End: 2024-05-07

## 2024-05-07 RX ORDER — ONDANSETRON 4 MG/1
4 TABLET, ORALLY DISINTEGRATING ORAL EVERY 30 MIN PRN
Status: DISCONTINUED | OUTPATIENT
Start: 2024-05-07 | End: 2024-05-08 | Stop reason: HOSPADM

## 2024-05-07 RX ORDER — OXYCODONE HYDROCHLORIDE 5 MG/1
10 TABLET ORAL
Status: DISCONTINUED | OUTPATIENT
Start: 2024-05-07 | End: 2024-05-08 | Stop reason: HOSPADM

## 2024-05-07 RX ORDER — OXYCODONE HYDROCHLORIDE 5 MG/1
5 TABLET ORAL
Status: DISCONTINUED | OUTPATIENT
Start: 2024-05-07 | End: 2024-05-08 | Stop reason: HOSPADM

## 2024-05-07 RX ORDER — NALOXONE HYDROCHLORIDE 0.4 MG/ML
0.1 INJECTION, SOLUTION INTRAMUSCULAR; INTRAVENOUS; SUBCUTANEOUS
Status: DISCONTINUED | OUTPATIENT
Start: 2024-05-07 | End: 2024-05-07 | Stop reason: HOSPADM

## 2024-05-07 RX ORDER — DEXAMETHASONE SODIUM PHOSPHATE 10 MG/ML
4 INJECTION, SOLUTION INTRAMUSCULAR; INTRAVENOUS
Status: DISCONTINUED | OUTPATIENT
Start: 2024-05-07 | End: 2024-05-08 | Stop reason: HOSPADM

## 2024-05-07 RX ORDER — DEXAMETHASONE SODIUM PHOSPHATE 10 MG/ML
4 INJECTION, SOLUTION INTRAMUSCULAR; INTRAVENOUS
Status: DISCONTINUED | OUTPATIENT
Start: 2024-05-07 | End: 2024-05-07 | Stop reason: HOSPADM

## 2024-05-07 RX ORDER — DEXAMETHASONE SODIUM PHOSPHATE 4 MG/ML
INJECTION, SOLUTION INTRA-ARTICULAR; INTRALESIONAL; INTRAMUSCULAR; INTRAVENOUS; SOFT TISSUE PRN
Status: DISCONTINUED | OUTPATIENT
Start: 2024-05-07 | End: 2024-05-07 | Stop reason: HOSPADM

## 2024-05-07 RX ORDER — BALANCED SALT SOLUTION 6.4; .75; .48; .3; 3.9; 1.7 MG/ML; MG/ML; MG/ML; MG/ML; MG/ML; MG/ML
SOLUTION OPHTHALMIC PRN
Status: DISCONTINUED | OUTPATIENT
Start: 2024-05-07 | End: 2024-05-07 | Stop reason: HOSPADM

## 2024-05-07 RX ORDER — KETOROLAC TROMETHAMINE 5 MG/ML
1 SOLUTION OPHTHALMIC 4 TIMES DAILY
Qty: 5 ML | Refills: 0 | Status: SHIPPED | OUTPATIENT
Start: 2024-05-07

## 2024-05-07 RX ORDER — DEXAMETHASONE SODIUM PHOSPHATE 4 MG/ML
INJECTION, SOLUTION INTRA-ARTICULAR; INTRALESIONAL; INTRAMUSCULAR; INTRAVENOUS; SOFT TISSUE PRN
Status: DISCONTINUED | OUTPATIENT
Start: 2024-05-07 | End: 2024-05-07

## 2024-05-07 RX ORDER — LIDOCAINE 40 MG/G
CREAM TOPICAL
Status: DISCONTINUED | OUTPATIENT
Start: 2024-05-07 | End: 2024-05-07 | Stop reason: HOSPADM

## 2024-05-07 RX ORDER — ONDANSETRON 2 MG/ML
INJECTION INTRAMUSCULAR; INTRAVENOUS PRN
Status: DISCONTINUED | OUTPATIENT
Start: 2024-05-07 | End: 2024-05-07

## 2024-05-07 RX ORDER — PROPARACAINE HYDROCHLORIDE 5 MG/ML
1 SOLUTION/ DROPS OPHTHALMIC ONCE
Status: COMPLETED | OUTPATIENT
Start: 2024-05-07 | End: 2024-05-07

## 2024-05-07 RX ORDER — HYDROMORPHONE HYDROCHLORIDE 1 MG/ML
0.2 INJECTION, SOLUTION INTRAMUSCULAR; INTRAVENOUS; SUBCUTANEOUS EVERY 5 MIN PRN
Status: DISCONTINUED | OUTPATIENT
Start: 2024-05-07 | End: 2024-05-07 | Stop reason: HOSPADM

## 2024-05-07 RX ORDER — ONDANSETRON 2 MG/ML
4 INJECTION INTRAMUSCULAR; INTRAVENOUS EVERY 30 MIN PRN
Status: DISCONTINUED | OUTPATIENT
Start: 2024-05-07 | End: 2024-05-08 | Stop reason: HOSPADM

## 2024-05-07 RX ORDER — SODIUM CHLORIDE, SODIUM LACTATE, POTASSIUM CHLORIDE, CALCIUM CHLORIDE 600; 310; 30; 20 MG/100ML; MG/100ML; MG/100ML; MG/100ML
INJECTION, SOLUTION INTRAVENOUS CONTINUOUS
Status: DISCONTINUED | OUTPATIENT
Start: 2024-05-07 | End: 2024-05-07 | Stop reason: HOSPADM

## 2024-05-07 RX ORDER — FENTANYL CITRATE 50 UG/ML
50 INJECTION, SOLUTION INTRAMUSCULAR; INTRAVENOUS EVERY 5 MIN PRN
Status: DISCONTINUED | OUTPATIENT
Start: 2024-05-07 | End: 2024-05-07 | Stop reason: HOSPADM

## 2024-05-07 RX ORDER — MOXIFLOXACIN 5 MG/ML
1 SOLUTION/ DROPS OPHTHALMIC 4 TIMES DAILY
Qty: 3 ML | Refills: 0 | Status: SHIPPED | OUTPATIENT
Start: 2024-05-07

## 2024-05-07 RX ORDER — FENTANYL CITRATE 50 UG/ML
25 INJECTION, SOLUTION INTRAMUSCULAR; INTRAVENOUS EVERY 5 MIN PRN
Status: DISCONTINUED | OUTPATIENT
Start: 2024-05-07 | End: 2024-05-07 | Stop reason: HOSPADM

## 2024-05-07 RX ORDER — ONDANSETRON 4 MG/1
4 TABLET, ORALLY DISINTEGRATING ORAL EVERY 30 MIN PRN
Status: DISCONTINUED | OUTPATIENT
Start: 2024-05-07 | End: 2024-05-07 | Stop reason: HOSPADM

## 2024-05-07 RX ORDER — ONDANSETRON 2 MG/ML
4 INJECTION INTRAMUSCULAR; INTRAVENOUS EVERY 30 MIN PRN
Status: DISCONTINUED | OUTPATIENT
Start: 2024-05-07 | End: 2024-05-07 | Stop reason: HOSPADM

## 2024-05-07 RX ORDER — TETRACAINE HYDROCHLORIDE 5 MG/ML
SOLUTION OPHTHALMIC PRN
Status: DISCONTINUED | OUTPATIENT
Start: 2024-05-07 | End: 2024-05-07 | Stop reason: HOSPADM

## 2024-05-07 RX ORDER — CYCLOPENTOLAT/TROPIC/PHENYLEPH 1%-1%-2.5%
1 DROPS (EA) OPHTHALMIC (EYE)
Status: COMPLETED | OUTPATIENT
Start: 2024-05-07 | End: 2024-05-07

## 2024-05-07 RX ORDER — ACETAMINOPHEN 325 MG/1
325 TABLET ORAL EVERY 4 HOURS PRN
Status: DISCONTINUED | OUTPATIENT
Start: 2024-05-07 | End: 2024-05-08 | Stop reason: HOSPADM

## 2024-05-07 RX ORDER — LIDOCAINE HYDROCHLORIDE 20 MG/ML
INJECTION, SOLUTION INFILTRATION; PERINEURAL PRN
Status: DISCONTINUED | OUTPATIENT
Start: 2024-05-07 | End: 2024-05-07

## 2024-05-07 RX ORDER — SODIUM CHLORIDE, SODIUM LACTATE, POTASSIUM CHLORIDE, CALCIUM CHLORIDE 600; 310; 30; 20 MG/100ML; MG/100ML; MG/100ML; MG/100ML
INJECTION, SOLUTION INTRAVENOUS CONTINUOUS
Status: DISCONTINUED | OUTPATIENT
Start: 2024-05-07 | End: 2024-05-08 | Stop reason: HOSPADM

## 2024-05-07 RX ORDER — PROPOFOL 10 MG/ML
INJECTION, EMULSION INTRAVENOUS CONTINUOUS PRN
Status: DISCONTINUED | OUTPATIENT
Start: 2024-05-07 | End: 2024-05-07

## 2024-05-07 RX ORDER — NALOXONE HYDROCHLORIDE 0.4 MG/ML
0.1 INJECTION, SOLUTION INTRAMUSCULAR; INTRAVENOUS; SUBCUTANEOUS
Status: DISCONTINUED | OUTPATIENT
Start: 2024-05-07 | End: 2024-05-08 | Stop reason: HOSPADM

## 2024-05-07 RX ORDER — ACETAMINOPHEN 325 MG/1
975 TABLET ORAL ONCE
Status: COMPLETED | OUTPATIENT
Start: 2024-05-07 | End: 2024-05-07

## 2024-05-07 RX ORDER — HYDROMORPHONE HYDROCHLORIDE 1 MG/ML
0.4 INJECTION, SOLUTION INTRAMUSCULAR; INTRAVENOUS; SUBCUTANEOUS EVERY 5 MIN PRN
Status: DISCONTINUED | OUTPATIENT
Start: 2024-05-07 | End: 2024-05-07 | Stop reason: HOSPADM

## 2024-05-07 RX ADMIN — PROPOFOL 130 MG: 10 INJECTION, EMULSION INTRAVENOUS at 08:05

## 2024-05-07 RX ADMIN — Medication 1 DROP: at 07:07

## 2024-05-07 RX ADMIN — LIDOCAINE HYDROCHLORIDE 60 MG: 20 INJECTION, SOLUTION INFILTRATION; PERINEURAL at 08:05

## 2024-05-07 RX ADMIN — ONDANSETRON 4 MG: 2 INJECTION INTRAMUSCULAR; INTRAVENOUS at 08:12

## 2024-05-07 RX ADMIN — ACETAMINOPHEN 975 MG: 325 TABLET ORAL at 06:57

## 2024-05-07 RX ADMIN — PROPARACAINE HYDROCHLORIDE 1 DROP: 5 SOLUTION/ DROPS OPHTHALMIC at 06:57

## 2024-05-07 RX ADMIN — SODIUM CHLORIDE, SODIUM LACTATE, POTASSIUM CHLORIDE, CALCIUM CHLORIDE: 600; 310; 30; 20 INJECTION, SOLUTION INTRAVENOUS at 07:57

## 2024-05-07 RX ADMIN — PROPOFOL 150 MCG/KG/MIN: 10 INJECTION, EMULSION INTRAVENOUS at 08:05

## 2024-05-07 RX ADMIN — Medication 1 DROP: at 07:00

## 2024-05-07 RX ADMIN — DEXAMETHASONE SODIUM PHOSPHATE 4 MG: 4 INJECTION, SOLUTION INTRA-ARTICULAR; INTRALESIONAL; INTRAMUSCULAR; INTRAVENOUS; SOFT TISSUE at 08:05

## 2024-05-07 RX ADMIN — Medication 1 DROP: at 07:12

## 2024-05-07 RX ADMIN — PROPOFOL 150 MCG/KG/MIN: 10 INJECTION, EMULSION INTRAVENOUS at 08:14

## 2024-05-07 NOTE — ANESTHESIA PREPROCEDURE EVALUATION
Anesthesia Pre-Procedure Evaluation    Patient: Martha Hayden   MRN: [id number] : 1950        Procedure : Procedure(s):  LEFT PHACOEMULSIFICATION, CATARACT, WITH STANDARD INTRAOCULAR LENS IMPLANT INSERTION          Past Medical History:   Diagnosis Date     OSTEOPENIA) [733.90] 2011    FRAX 9.2/1.2%    Anxiety     Headache(784.0) 2011    Hearing disorder 2011    Hearing loss 2011    Insomnia 2011    Melanoma in situ (H)     MS (multiple sclerosis) (H)     PTSD (post-traumatic stress disorder)     related to dx MS    Recurrent major depression (H24)     Recurrent major depression in complete remission (H24)     Urinary frequency 2011    Vision abnormalities 2011    Wears glasses 2011      Past Surgical History:   Procedure Laterality Date    PHACOEMULSIFICATION CLEAR CORNEA WITH STANDARD INTRAOCULAR LENS IMPLANT Right 2024    Procedure: RIGHT EYE PHACOEMULSIFICATION, CATARACT, WITH INTRAOCULAR LENS IMPLANT;  Surgeon: Dominique Augustine MD;  Location: [facility] OR    RETINAL REATTACHMENT      TONSILLECTOMY        No Known Allergies   Social History     Tobacco Use    Smoking status: Former     Current packs/day: 0.00     Types: Cigarettes     Start date: 9/10/1966     Quit date: 1995     Years since quittin.2    Smokeless tobacco: Never    Tobacco comments:     intermittent smoker, no specific pattern   Substance Use Topics    Alcohol use: No     Comment: Recovering alcoholic for 23 years      Wt Readings from Last 1 Encounters:   24 59.4 kg (131 lb)              OUTSIDE LABS:  CBC:   Lab Results   Component Value Date    WBC 4.2 2016    WBC 5.1 2012    HGB 12.0 2016    HGB 12.9 2014    HCT 35.7 2016    HCT 37.4 2012     2016     2012     BMP:   Lab Results   Component Value Date     2016     2013    POTASSIUM 4.3 2016    POTASSIUM 4.0 2013  "   CHLORIDE 105 09/22/2016    CHLORIDE 103 04/12/2013    CO2 30 09/22/2016    CO2 27 04/12/2013    BUN 11 09/22/2016    BUN 16 04/12/2013    CR 0.70 09/22/2016    CR 0.79 04/12/2013     (H) 09/22/2016    GLC 79 04/14/2015     COAGS: No results found for: \"PTT\", \"INR\", \"FIBR\"  POC: No results found for: \"BGM\", \"HCG\", \"HCGS\"  HEPATIC:   Lab Results   Component Value Date    ALBUMIN 4.0 09/22/2016    PROTTOTAL 7.1 09/22/2016    ALT 25 09/22/2016    AST 17 09/22/2016    ALKPHOS 85 09/22/2016    BILITOTAL 0.3 09/22/2016     OTHER:   Lab Results   Component Value Date    HANS 9.1 09/22/2016    TSH 1.94 09/22/2016    T4 0.90 09/18/2008       Anesthesia Plan    ASA Status:  2       Anesthesia Type: General.     - Airway: LMA   Induction: Intravenous.   Maintenance: TIVA.        Consents    Anesthesia Plan(s) and associated risks, benefits, and realistic alternatives discussed. Questions answered and patient/representative(s) expressed understanding.     - Discussed:     - Discussed with:  Patient            Postoperative Care    Pain management: Multi-modal analgesia.   PONV prophylaxis: Ondansetron (or other 5HT-3)     Comments:               Simran Macias MD    I have reviewed the pertinent notes and labs in the chart from the past 30 days and (re)examined the patient.  Any updates or changes from those notes are reflected in this note.                  "

## 2024-05-07 NOTE — DISCHARGE INSTRUCTIONS
"[facility]  Home Care Following Anesthesia  For 24 hours after surgery:  Get plenty of rest.  A responsible adult must stay with you for at least 24 hours after you leave the surgery center.  Do not drive or use heavy equipment.  If you have weakness or tingling, don't drive or use heavy equipment until this feeling goes away.   Do not drink alcohol.   Avoid strenuous or risky activities.  Ask for help when climbing stairs.  You may feel lightheaded.  IF so, sit for a few minutes before standing.  Have someone help you get up.   If you have nausea (feel sick to your stomach): Drink only clear liquids such as apple juice, ginger ale, broth or 7-Up.  Rest may also help.  Be sure to drink enough fluids.  Move to a regular diet as you feel able.   You may have a slight fever.  Call the doctor if your fever is over 100 F (37.7 C) (taken under the tongue) or lasts longer than 24 hours.  You may have a dry mouth, a sore throat, muscle aches or trouble sleeping. These should go away after 24 hours.  Do not make important or legal decisions.   It is recommended to avoid smoking.        Today you received a Marcaine or bupivacaine block to numb the nerves near your surgery site.  This is a block using local anesthetic or \"numbing\" medication injected around the nerves to anesthetize or \"numb\" the area supplied by those nerves.  This block is injected into the muscle layer near your surgical site.  The medication may numb the location where you had surgery for 6-18 hours, but may last up to 24 hours.  If your surgical site is an arm or leg you should be careful with your affected limb, since it is possible to injure your limb without being aware of it due to the numbing.  Until full feeling returns, you should guard against bumping or hitting your limb, and avoid extreme hot or cold temperatures on the skin.  As the block wears off, the feeling will return as a tingling or prickly " sensation near your surgical site.  You will experience more discomfort from your incision as the feeling returns.  You may want to take a pain pill (a narcotic or Tylenol if this was prescribed by your surgeon) when you start to experience mild pain before the pain beccomes more severe.  If your pain medications do not control your pain you should notifiy your surgeon.    Tips for taking pain medications  To get the best pain relief possible, remember these points:  Take pain medications as directed, before pain becomes severe.  Pain medication can upset your stomach: taking it with food may help.  Constipation is a common side effect of pain medication. Drink plenty of  fluids.  Eat foods high in fiber. Take a stool softener if recommended by your doctor or pharmacist.  Do not drink alcohol, drive or operate machinery while taking pain medications.  Ask about other ways to control pain, such as with heat, ice or relaxation.    Tylenol/Acetaminophen Consumption    If you feel your pain relief is insufficient, you may take Tylenol/Acetaminophen in addition to your narcotic pain medication.   Be careful not to exceed 4,000 mg of Tylenol/Acetaminophen in a 24 hour period from all sources.  If you are taking extra strength Tylenol/acetaminophen (500 mg), the maximum dose is 8 tablets in 24 hours.  If you are taking regular strength acetaminophen (325 mg), the maximum dose is 12 tablets in 24 hours.    975 mg Tylenol taken at 6:57 AM, OK to take additional Tylenol after 12:57 PM. Follow package instructions.    Call a doctor for any of the following:  Signs of infection (fever, growing tenderness at the surgery site, a large amount of drainage or bleeding, severe pain, foul-smelling drainage, redness, swelling).  It has been over 8 to 10 hours since surgery and you are still not able to urinate (pass water).  Headache for over 24 hours.  Signs of Covid-19 infection (temperature over 100 degrees, shortness of breath,  cough, loss of taste/smell, generalized body aches, persistent headache, chills, sore throat, nausea/vomiting/diarrhea)  Your doctor is:   Dr. Dominique Augustine, Ophthalmology: [phone]               Or dial [phone] and ask for the resident on call for:  Ophthalmology  For emergency care, call the:  [facility]:  [phone] (TTY for hearing impaired: [phone])

## 2024-05-07 NOTE — OP NOTE
SURGEON:  LINDA COLVIN   Assistant surgeon: Reina Rosas MD  PREOPERATIVE DIAGNOSIS:  visually significant nucleosclerotic cataract left eye   POSTOPERATIVE DIAGNOSIS: same  NAME OF THE PROCEDURE: Phacoemulsification with intraocular lens implantation, left eye   ANESTHESIA: Monitored anesthesia care and peribulbar block   COMPLICATIONS: none  INDICATIONS: Martha Hayden is a 73 year old with diagnosis of visually significant cataract, here for cataract surgery    DESCRIPTION OF THE PROCEDURE:  The patient was taken to the operative room where general anesthesia was administered.    The operative eye was prepped and draped in the usual sterile surgical fashion for ophthalmic surgery, including the installation of one drop of 5% Povidone Iodine.  A sterile drape was placed over the face and body and a lid speculum was inserted.      With the use of a Supersharp blade and 0.12 forceps, a paracentesis was created at the 2 o'clock position, and viscoelastic was injected into the anterior chamber using a canula.  A 2.5 mm keratome was then used to construct a clear corneal incision at the 10 o'clock position.  Using Utrata forceps and cystotome needle, a continuous curvilinear capsulorrhexis was created and hydrodissection was undertaken with the use of BSS.  The nucleus was found to be freely mobile and then removed by phacoemulsification using a divide and conquer technique.  The remaining elements of cortex were then removed with irrigation/aspiration.  An IOL,was injected into the capsular bag and was rotated into a good position with a Sinskey hook. The remaining elements of viscoelastic were then removed with irrigation/aspiration. The wounds were hydrated and were watertight. The lid speculum was removed.  Subconjunctival injection of Dexamethasone and Ancef were administered. The eye was cleaned with wet and dry gauze. Maxitrol ointment was placed on the eye.  A patch and  shield were placed over  the eye.  The patient was discharge in stable condition having tolerated the procedure well      Implant Name Type Inv. Item Serial No.  Lot No. LRB No. Used Action   LENS [id number] 17.0 [employer] UV ASPHERIC BICONVEX IOL - [id number] Lens/Eye Implant LENS [id number] 17.0 CL[employer] UV ASPHERIC BICONVEX IOL [id number] [employer]  Left 1 Implanted       The surgery was assisted by Dr. Reina Rosas. Due to the delicate and complex nature of this surgery, an assistant was required and No qualified resident was available. He assisted with vitrectomy. I was present for the entire surgery.

## 2024-05-07 NOTE — BRIEF OP NOTE
[facility] Brief Operative Note    Pre-operative diagnosis: Nuclear sclerosis of left eye [H25.12]   Post-operative diagnosis * No post-op diagnosis entered *  Same   Procedure: Procedure(s):  LEFT PHACOEMULSIFICATION, CATARACT, WITH STANDARD INTRAOCULAR LENS IMPLANT INSERTION   Surgeon(s): Surgeons and Role:     * Dominique Augustine MD - Primary     * Danay Rosas MD - Resident - Assisting   Estimated blood loss: * No values recorded between 5/7/2024  8:20 AM and 5/7/2024  8:37 AM *    Specimens: * No specimens in log *   Findings: No complications

## 2024-05-07 NOTE — ANESTHESIA PROCEDURE NOTES
Airway       Patient location during procedure: OR  Staff -        Anesthesiologist:  Simran Macias MD       Performed By: CRNAIndications and Patient Condition       Indications for airway management: corinna-procedural       Induction type:intravenous       Mask difficulty assessment: 1 - vent by mask    Final Airway Details       Final airway type: supraglottic airway    Supraglottic Airway Details        Type: LMA       Brand: I-Gel       LMA size: 4    Post intubation assessment        Placement verified by: capnometry, equal breath sounds and chest rise        Number of attempts at approach: 1       Number of other approaches attempted: 0       Secured with: tape       Ease of procedure: easy       Dentition: Intact and Unchanged

## 2024-05-07 NOTE — ANESTHESIA CARE TRANSFER NOTE
Patient: Martha Hayden    Procedure: Procedure(s):  LEFT PHACOEMULSIFICATION, CATARACT, WITH STANDARD INTRAOCULAR LENS IMPLANT INSERTION       Diagnosis: Nuclear sclerosis of left eye [H25.12]  Diagnosis Additional Information: No value filed.    Anesthesia Type:   General     Note:    Oropharynx: oropharynx clear of all foreign objects  Level of Consciousness: awake  Oxygen Supplementation: face mask    Independent Airway: airway patency satisfactory and stable  Dentition: dentition unchanged  Vital Signs Stable: post-procedure vital signs reviewed and stable  Report to RN Given: handoff report given  Patient transferred to: PACU    Handoff Report: Identifed the Patient, Identified the Reponsible Provider, Reviewed the pertinent medical history, Discussed the surgical course, Reviewed Intra-OP anesthesia mangement and issues during anesthesia, Set expectations for post-procedure period and Allowed opportunity for questions and acknowledgement of understanding      Vitals:  Vitals Value Taken Time   /69 05/07/24 0846   Temp     Pulse 69 05/07/24 0847   Resp 16 05/07/24 0847   SpO2 100 % 05/07/24 0847   Vitals shown include unfiled device data.    Electronically Signed By: RUBÉN Langley CRNA  May 7, 2024  8:48 AM

## 2024-05-07 NOTE — ANESTHESIA POSTPROCEDURE EVALUATION
Patient: Martha Hayden    Procedure: Procedure(s):  LEFT PHACOEMULSIFICATION, CATARACT, WITH STANDARD INTRAOCULAR LENS IMPLANT INSERTION       Anesthesia Type:  General    Note:  Disposition: Outpatient   Postop Pain Control: Uneventful            Sign Out: Well controlled pain   PONV: No   Neuro/Psych: Uneventful            Sign Out: Acceptable/Baseline neuro status   Airway/Respiratory: Uneventful            Sign Out: Acceptable/Baseline resp. status   CV/Hemodynamics: Uneventful            Sign Out: Acceptable CV status; No obvious hypovolemia; No obvious fluid overload   Other NRE: NONE   DID A NON-ROUTINE EVENT OCCUR? No           Last vitals:  Vitals Value Taken Time   /55 05/07/24 0900   Temp 36.1  C (97  F) 05/07/24 0846   Pulse 79 05/07/24 0900   Resp 20 05/07/24 0900   SpO2 100 % 05/07/24 0900       Electronically Signed By: Simran Macias MD  May 7, 2024  12:02 PM

## 2024-05-08 ENCOUNTER — OFFICE VISIT (OUTPATIENT)
Dept: OPHTHALMOLOGY | Facility: CLINIC | Age: 74
End: 2024-05-08
Attending: OPHTHALMOLOGY
Payer: COMMERCIAL

## 2024-05-08 DIAGNOSIS — H40.052 BORDERLINE GLAUCOMA OF LEFT EYE WITH OCULAR HYPERTENSION: Primary | ICD-10-CM

## 2024-05-08 PROCEDURE — 99024 POSTOP FOLLOW-UP VISIT: CPT | Performed by: OPHTHALMOLOGY

## 2024-05-08 PROCEDURE — G0463 HOSPITAL OUTPT CLINIC VISIT: HCPCS | Performed by: OPHTHALMOLOGY

## 2024-05-08 RX ORDER — TIMOLOL MALEATE 5 MG/ML
1 SOLUTION/ DROPS OPHTHALMIC 2 TIMES DAILY
Qty: 5 ML | Refills: 11 | Status: SHIPPED | OUTPATIENT
Start: 2024-05-08

## 2024-05-08 ASSESSMENT — VISUAL ACUITY
OS_SC: 20/40
METHOD: SNELLEN - LINEAR
OD_PH_SC+: +1
OS_PH_SC+: -2
OD_SC: 20/80
OD_PH_SC: 20/40
OS_PH_SC: 20/40

## 2024-05-08 ASSESSMENT — TONOMETRY
OS_IOP_MMHG: 29
IOP_METHOD: TONOPEN
IOP_METHOD: TONOPEN
OD_IOP_MMHG: 17
OS_IOP_MMHG: 29

## 2024-05-08 ASSESSMENT — SLIT LAMP EXAM - LIDS
COMMENTS: NORMAL
COMMENTS: NORMAL

## 2024-05-08 NOTE — PROGRESS NOTES
CC: status post  Cataract extraction intraocular lens both eyes   Interval history: Reports she had some headache, no eye pain, no irritation or increase in redness  Previously referred by: from a patient of mine - Ms Jara  HPI: Martha Hayden is a 73 year old year-old patient with history of Age related macular degeneration and Cataracts. She says her glasses are current but she does not see as well as she used to, and no longer drives at night because of  glare. Has noticed more difficulty reading without her glasses close up as well as driving at night. She has stopped driving at night due to glare. She thinks she might be ready to move forward with cataract surgery.    PMH: MM in situ, anxiety, headache , insomnia, esophagitis; low back pain  Past ocular history: History of ocular migraines, and retinal tear.     Retinal Imaging:  Optical Coherence Tomography 1/25/24  Right eye with Epiretinal membrane; and cystic changes   Left eye: drusen and Retinal pigment epithelium changes in both eyes; no subretinal fluid; trace Epiretinal membrane     Fundus photos consistent with exam 09/07/23   Autofluorescence: macula hyper and hypoautofluorescent spots consistent with drusen    Assessment & Plan:  # POD1 Cataract extraction intraocular lens left eye 05/07/24   #POW1 Cataract extraction intraocular lens right eye 4.23.24    Left eye   Ketorolac (gray top) four times a day    Predforte  (pink top) four times a day  (shake the bottle before)  Ofloxacin (tan top) four times a day    Put the eyedrops 5 minutes a part  Eye shield or glasses at all times x 3 weeks  Sleep with the shield  No heavy lifting   Follow-up in one week    Add timolol (yellow top) twice a day     Right eye   Ketorolac (gray top) ok to stop  Predforte  (pink top)  twice a day x 1 week and then once a day till finish  (shake the bottle before)  Follow-up in three weeks  Retina detachment and endophthalmitis precautions were discussed with the  "patient (increased blurry vision, drainage, new flashes, floaters or a curtain in the visual field) and was asked to return if any of the those occur    What to watch out for:  If you experience any of the following \"RSVP Symptoms\", you should call immediately:  Worsening Redness  Worsening Sensitivity to light  Worsening Vision, including new flashing lights or floaters  Worsening Pain, including nausea/vomiting    # Dry Age related macular degeneration both eyes   AREDS supplementation is recommended.  Weekly Amsler Grid use was discussed and training performed.  A diet of leafy green vegetables was encouraged.  Return precautions were given.    # Posterior vitreous detachment (PVD) both eyes   Retina detachment precautions were discussed with the patient (presence or increased in flashes, floaters or a curtain in the visual field) and was asked to return if any of the those occur     # history of retina tear right eye   # VR adhesion left eye   Retina attached both eyes on scleral depression      # Epiretinal membrane right eye   With pseudohole and cystic changes   Observe    #myopia  Retina detachment precautions were discussed with the patient (presence or increased in flashes, floaters or a curtain in the visual field) and was asked to return if any of the those occur     PLAN:   Follow up in one week       ~~~~~~~~~~~~~~~~~~~~~~~~~~~~~~~~~~   Complete documentation of historical and exam elements from today's encounter can be found in the full encounter summary report (not reduplicated in this progress note).  I personally obtained the chief complaint(s) and history of present illness.  I confirmed and edited as necessary the review of systems, past medical/surgical history, family history, social history, and examination findings as documented by others; and I examined the patient myself.  I personally reviewed the relevant tests, images, and reports as documented above.  I formulated and edited as " necessary the assessment and plan and discussed the findings and management plan with the patient and family    Dominique Augustine MD  Professor of Ophthalmology.   Vitreo-retinal surgeon   Department of Ophthalmology and Visual Neurosciences   [facility]  Phone: [phone]   Fax: [phone]

## 2024-05-08 NOTE — PATIENT INSTRUCTIONS
"  Left eye   Ketorolac (gray top) four times a day    Predforte  (pink top) four times a day  (shake the bottle before)  Ofloxacin (tan top) four times a day    Put the eyedrops 5 minutes a part  Eye shield or glasses at all times x 3 weeks  Sleep with the shield  No heavy lifting   Follow-up in one week    Add timolol (yellow top) twice a day     Right eye   Stop Ketorolac (gray top)  Predforte  (pink top)  twice a day x 1 week and then once a day till finish  (shake the bottle before)  Follow-up in three weeks  Retina detachment and endophthalmitis precautions were discussed with the patient (increased blurry vision, drainage, new flashes, floaters or a curtain in the visual field) and was asked to return if any of the those occur    What to watch out for:  If you experience any of the following \"RSVP Symptoms\", you should call immediately:  Worsening Redness  Worsening Sensitivity to light  Worsening Vision, including new flashing lights or floaters  Worsening Pain, including nausea/vomiting  "

## 2024-05-15 ENCOUNTER — OFFICE VISIT (OUTPATIENT)
Dept: OPHTHALMOLOGY | Facility: CLINIC | Age: 74
End: 2024-05-15
Attending: OPHTHALMOLOGY
Payer: COMMERCIAL

## 2024-05-15 DIAGNOSIS — Z48.810 AFTERCARE FOLLOWING SURGERY OF A SENSORY ORGAN: Primary | ICD-10-CM

## 2024-05-15 PROCEDURE — G0463 HOSPITAL OUTPT CLINIC VISIT: HCPCS | Performed by: OPHTHALMOLOGY

## 2024-05-15 PROCEDURE — 99024 POSTOP FOLLOW-UP VISIT: CPT | Mod: GC | Performed by: OPHTHALMOLOGY

## 2024-05-15 ASSESSMENT — REFRACTION_WEARINGRX
OS_AXIS: 010
OS_SPHERE: -5.75
OD_SPHERE: -2.75
OS_CYLINDER: +1.00
OD_CYLINDER: SPHERE

## 2024-05-15 ASSESSMENT — CONF VISUAL FIELD
OD_SUPERIOR_NASAL_RESTRICTION: 0
OS_INFERIOR_TEMPORAL_RESTRICTION: 0
OS_SUPERIOR_NASAL_RESTRICTION: 0
OD_INFERIOR_TEMPORAL_RESTRICTION: 0
OD_INFERIOR_NASAL_RESTRICTION: 0
METHOD: COUNTING FINGERS
OS_SUPERIOR_TEMPORAL_RESTRICTION: 0
OD_NORMAL: 1
OS_INFERIOR_NASAL_RESTRICTION: 0
OS_NORMAL: 1
OD_SUPERIOR_TEMPORAL_RESTRICTION: 0

## 2024-05-15 ASSESSMENT — VISUAL ACUITY
OS_PH_SC: 20/50
OD_PH_SC: 20/25
OD_PH_SC+: -1
OD_SC+: -1
METHOD: SNELLEN - LINEAR
OS_SC: 20/125
OS_SC+: -1
OD_SC: 20/125

## 2024-05-15 ASSESSMENT — TONOMETRY
OS_IOP_MMHG: 15
OD_IOP_MMHG: 16
IOP_METHOD: TONOPEN

## 2024-05-15 ASSESSMENT — SLIT LAMP EXAM - LIDS
COMMENTS: NORMAL
COMMENTS: NORMAL

## 2024-05-15 NOTE — PROGRESS NOTES
CC: status post  Cataract extraction intraocular lens both eyes   Interval history: Pt uncertain if vision has changed. Pt states that images are moving. No eye pain today, just burning with eye drop use.  No floaters. Occasional flashes in each eye when pt is trying to read.  Previously referred by: from a patient of [facility] - Ms Jara  HPI: Martha Hayden is a 73 year old year-old patient with history of Age related macular degeneration and Cataracts. She says her glasses are current but she does not see as well as she used to, and no longer drives at night because of  glare. Has noticed more difficulty reading without her glasses close up as well as driving at night. She has stopped driving at night due to glare. She thinks she might be ready to move forward with cataract surgery.    PMH: MM in situ, anxiety, headache , insomnia, esophagitis; low back pain  Past ocular history: History of ocular migraines, and retinal tear.     Retinal Imaging:  Optical Coherence Tomography 1/25/24  Right eye with Epiretinal membrane; and cystic changes   Left eye: drusen and Retinal pigment epithelium changes in both eyes; no subretinal fluid; trace Epiretinal membrane     Fundus photos consistent with exam 09/07/23   Autofluorescence: macula hyper and hypoautofluorescent spots consistent with drusen    Assessment & Plan:  # Cataract extraction intraocular lens left eye 05/07/24   #Cataract extraction intraocular lens right eye 4.23.24    Left eye   Ketorolac (gray top) 3x times a day for 7 days, then 2x day for 7 days, then 1x day until bottle is finished  Predforte  (pink top) 3x times a day for 7 days, then 2x day for 7 days, then 1x day until bottle is finished (shake the bottle before)  Put the eyedrops 5 minutes a part  Stop timolol  Eye shield or glasses at all times x 2 weeks  Sleep with the shield  Follow-up in three weeks    STOP timolol (yellow top)    Right eye   Predforte  (pink top)  once a day till finish   "(shake the bottle before)  Retina detachment and endophthalmitis precautions were discussed with the patient (increased blurry vision, drainage, new flashes, floaters or a curtain in the visual field) and was asked to return if any of the those occur    What to watch out for:  If you experience any of the following \"RSVP Symptoms\", you should call immediately:  Worsening Redness  Worsening Sensitivity to light  Worsening Vision, including new flashing lights or floaters  Worsening Pain, including nausea/vomiting    # Dry Age related macular degeneration both eyes   AREDS supplementation is recommended.  Weekly Amsler Grid use was discussed and training performed.  A diet of leafy green vegetables was encouraged.  Return precautions were given.    # Posterior vitreous detachment (PVD) both eyes   Retina detachment precautions were discussed with the patient (presence or increased in flashes, floaters or a curtain in the visual field) and was asked to return if any of the those occur     # history of retina tear right eye   # VR adhesion left eye   Retina attached both eyes on scleral depression      # Epiretinal membrane right eye   With pseudohole and cystic changes   Observe    #myopia  Retina detachment precautions were discussed with the patient (presence or increased in flashes, floaters or a curtain in the visual field) and was asked to return if any of the those occur     PLAN:   Follow up in three weeks for manifest refraction and dilated fundus exam both eyes    Fer Brand MD, MPH   VItreoretinal Fellow PGY-6     ~~~~~~~~~~~~~~~~~~~~~~~~~~~~~~~~~~   Complete documentation of historical and exam elements from today's encounter can be found in the full encounter summary report (not reduplicated in this progress note).  I personally obtained the chief complaint(s) and history of present illness.  I confirmed and edited as necessary the review of systems, past medical/surgical history, family history, social " history, and examination findings as documented by others; and I examined the patient myself.  I personally reviewed the relevant tests, images, and reports as documented above.  I formulated and edited as necessary the assessment and plan and discussed the findings and management plan with the patient and family    Dominique Augustine MD  Professor of Ophthalmology.   Vitreo-retinal surgeon   Department of Ophthalmology and Visual Neurosciences   [facility]  Phone: [phone]   Fax: [phone]

## 2024-05-15 NOTE — NURSING NOTE
Chief Complaints and History of Present Illnesses   Patient presents with    Post Op (Ophthalmology) Left Eye     POW#1 s/p CE/IOL LE     Chief Complaint(s) and History of Present Illness(es)       Post Op (Ophthalmology) Left Eye              Laterality: left eye    Comments: POW#1 s/p CE/IOL LE              Comments    Pt uncertain if vision has changed. Pt states that images are moving. No eye pain today, just burning with eye drop use.  No floaters. Occasional flashes in each eye when pt is trying to read.    REESE Mehta May 15, 2024 9:43 AM

## 2024-05-15 NOTE — PATIENT INSTRUCTIONS
"  Left eye   Ketorolac (gray top) 3x times a day for 7 days, then 2x day for 7 days, then 1x day until bottle is finished  Predforte  (pink top) 3x times a day for 7 days, then 2x day for 7 days, then 1x day until bottle is finished (shake the bottle before)  Put the eyedrops 5 minutes a part  Stop timolol  Eye shield or glasses at all times x 2 weeks  Sleep with the shield  Follow-up in three weeks    STOP timolol (yellow top)    Right eye   Predforte  (pink top)  once a day till finish  (shake the bottle before)  Retina detachment and endophthalmitis precautions were discussed with the patient (increased blurry vision, drainage, new flashes, floaters or a curtain in the visual field) and was asked to return if any of the those occur    What to watch out for:  If you experience any of the following \"RSVP Symptoms\", you should call immediately:  Worsening Redness  Worsening Sensitivity to light  Worsening Vision, including new flashing lights or floaters  Worsening Pain, including nausea/vomiting  "

## 2024-05-29 ENCOUNTER — OFFICE VISIT (OUTPATIENT)
Dept: OPHTHALMOLOGY | Facility: CLINIC | Age: 74
End: 2024-05-29
Attending: OPHTHALMOLOGY
Payer: COMMERCIAL

## 2024-05-29 DIAGNOSIS — H35.373 EPIRETINAL MEMBRANE (ERM) OF BOTH EYES: Primary | ICD-10-CM

## 2024-05-29 PROCEDURE — 99024 POSTOP FOLLOW-UP VISIT: CPT | Performed by: OPHTHALMOLOGY

## 2024-05-29 PROCEDURE — 92015 DETERMINE REFRACTIVE STATE: CPT

## 2024-05-29 PROCEDURE — G0463 HOSPITAL OUTPT CLINIC VISIT: HCPCS | Performed by: OPHTHALMOLOGY

## 2024-05-29 PROCEDURE — 92134 CPTRZ OPH DX IMG PST SGM RTA: CPT | Performed by: OPHTHALMOLOGY

## 2024-05-29 ASSESSMENT — CONF VISUAL FIELD
OD_SUPERIOR_TEMPORAL_RESTRICTION: 0
METHOD: COUNTING FINGERS
OS_SUPERIOR_TEMPORAL_RESTRICTION: 0
OD_INFERIOR_NASAL_RESTRICTION: 0
OD_INFERIOR_TEMPORAL_RESTRICTION: 0
OS_INFERIOR_TEMPORAL_RESTRICTION: 0
OS_NORMAL: 1
OS_SUPERIOR_NASAL_RESTRICTION: 0
OD_NORMAL: 1
OD_SUPERIOR_NASAL_RESTRICTION: 0
OS_INFERIOR_NASAL_RESTRICTION: 0

## 2024-05-29 ASSESSMENT — VISUAL ACUITY
OS_SC: 20/125
METHOD: SNELLEN - LINEAR
OD_SC: 20/100
OS_PH_SC: 20/40
OD_PH_SC: 20/40

## 2024-05-29 ASSESSMENT — SLIT LAMP EXAM - LIDS
COMMENTS: NORMAL
COMMENTS: NORMAL

## 2024-05-29 ASSESSMENT — TONOMETRY
OS_IOP_MMHG: 15
OD_IOP_MMHG: 14
IOP_METHOD: TONOPEN

## 2024-05-29 ASSESSMENT — REFRACTION_MANIFEST
OD_CYLINDER: +1.00
OS_SPHERE: -4.25
OD_SPHERE: -3.50
OS_ADD: +2.50
OS_AXIS: 145
OD_ADD: +2.50
OS_CYLINDER: +0.75
OD_AXIS: 035

## 2024-05-29 ASSESSMENT — REFRACTION_WEARINGRX
OD_CYLINDER: SPHERE
OD_SPHERE: -2.75

## 2024-05-29 NOTE — PROGRESS NOTES
CC: status post  Cataract extraction intraocular lens both eyes   Interval history: complaining of some flickering occasional. No eye pain today. [facility] improving. Reading ok but occasional misses letters  Previously referred by: from a patient of [facility] - Ms Jara  HPI: Martha Hayden is a 73 year old year-old patient with history of Age related macular degeneration and Cataracts. She says her glasses are current but she does not see as well as she used to, and no longer drives at night because of  glare. Has noticed more difficulty reading without her glasses close up as well as driving at night. She has stopped driving at night due to glare. She thinks she might be ready to move forward with cataract surgery.    PMH: MM in situ, anxiety, headache , insomnia, esophagitis; low back pain  Past ocular history: History of ocular migraines, and retinal tear.     Retinal Imaging:  Optical Coherence Tomography 05/29/24   Right eye with Epiretinal membrane; and cystic changes   Left eye: drusen and Retinal pigment epithelium changes in both eyes; no subretinal fluid; trace Epiretinal membrane     Fundus photos consistent with exam 09/07/23   Autofluorescence: macula hyper and hypoautofluorescent spots consistent with drusen    Assessment & Plan:  # Cataract extraction intraocular lens left eye 05/07/24   #Cataract extraction intraocular lens right eye 4.23.24  Intraocular lens in good position   With posterior capsular opacity (PCO) both eyes; consider yag next follow up   Ok to stop eyedrops   Retina detachment and endophthalmitis precautions were discussed with the patient (increased blurry vision, drainage, new flashes, floaters or a curtain in the visual field) and was asked to return if any of the those occur    # Dry Age related macular degeneration both eyes   AREDS supplementation is recommended.  Weekly Amsler Grid use was discussed and training performed.  A diet of leafy green vegetables was encouraged.  Return  precautions were given.    # Posterior vitreous detachment (PVD) both eyes   Retina detachment precautions were discussed with the patient (presence or increased in flashes, floaters or a curtain in the visual field) and was asked to return if any of the those occur     # history of retina tear right eye   # VR adhesion left eye   Retina attached both eyes on scleral depression      # Epiretinal membrane right eye   With pseudohole and cystic changes   Observe    #myopia  Retina detachment precautions were discussed with the patient (presence or increased in flashes, floaters or a curtain in the visual field) and was asked to return if any of the those occur     PLAN:  prescription given    ~~~~~~~~~~~~~~~~~~~~~~~~~~~~~~~~~~   Complete documentation of historical and exam elements from today's encounter can be found in the full encounter summary report (not reduplicated in this progress note).  I personally obtained the chief complaint(s) and history of present illness.  I confirmed and edited as necessary the review of systems, past medical/surgical history, family history, social history, and examination findings as documented by others; and I examined the patient myself.  I personally reviewed the relevant tests, images, and reports as documented above.  I formulated and edited as necessary the assessment and plan and discussed the findings and management plan with the patient and family    Dominique Augustine MD  Professor of Ophthalmology.   Vitreo-retinal surgeon   Department of Ophthalmology and Visual Neurosciences   [facility]  Phone: [phone]   Fax: [phone]

## 2024-05-29 NOTE — NURSING NOTE
"Chief Complaints and History of Present Illnesses   Patient presents with    Post Op (Ophthalmology) Both Eyes     2 week follow up both eyes     Chief Complaint(s) and History of Present Illness(es)       Post Op (Ophthalmology) Both Eyes              Comments: 2 week follow up both eyes              Comments    Pt states vision is about the same as last visit. Pt noticing a \"shimmery\" light around the edges or her vision peripherally.  No new floaters. No redness or dryness.    REESE Mehta May 29, 2024 9:50 AM                         "

## 2024-06-15 ENCOUNTER — HEALTH MAINTENANCE LETTER (OUTPATIENT)
Age: 74
End: 2024-06-15

## 2024-07-24 DIAGNOSIS — H35.373 EPIRETINAL MEMBRANE (ERM) OF BOTH EYES: Primary | ICD-10-CM

## 2024-08-08 ENCOUNTER — OFFICE VISIT (OUTPATIENT)
Dept: OPHTHALMOLOGY | Facility: CLINIC | Age: 74
End: 2024-08-08
Attending: OPHTHALMOLOGY
Payer: COMMERCIAL

## 2024-08-08 DIAGNOSIS — H35.373 EPIRETINAL MEMBRANE (ERM) OF BOTH EYES: ICD-10-CM

## 2024-08-08 PROCEDURE — 99213 OFFICE O/P EST LOW 20 MIN: CPT | Performed by: OPHTHALMOLOGY

## 2024-08-08 PROCEDURE — G0463 HOSPITAL OUTPT CLINIC VISIT: HCPCS | Performed by: OPHTHALMOLOGY

## 2024-08-08 PROCEDURE — 92134 CPTRZ OPH DX IMG PST SGM RTA: CPT | Performed by: OPHTHALMOLOGY

## 2024-08-08 ASSESSMENT — CONF VISUAL FIELD
OD_SUPERIOR_NASAL_RESTRICTION: 0
OS_INFERIOR_NASAL_RESTRICTION: 0
OD_INFERIOR_TEMPORAL_RESTRICTION: 0
OD_SUPERIOR_TEMPORAL_RESTRICTION: 0
OS_SUPERIOR_TEMPORAL_RESTRICTION: 0
OS_INFERIOR_TEMPORAL_RESTRICTION: 0
OD_INFERIOR_NASAL_RESTRICTION: 0
OS_NORMAL: 1
METHOD: COUNTING FINGERS
OS_SUPERIOR_NASAL_RESTRICTION: 0

## 2024-08-08 ASSESSMENT — TONOMETRY
OD_IOP_MMHG: 16
IOP_METHOD: TONOPEN
OS_IOP_MMHG: 15

## 2024-08-08 ASSESSMENT — REFRACTION_WEARINGRX
OD_SPHERE: -3.50
OS_CYLINDER: +0.50
OD_CYLINDER: +0.75
OS_AXIS: 062
OD_AXIS: 045
OS_SPHERE: -5.00

## 2024-08-08 ASSESSMENT — VISUAL ACUITY
OS_CC: 20/70
METHOD: SNELLEN - LINEAR
OD_CC: 20/20
OS_CC+: +2
CORRECTION_TYPE: GLASSES
OS_PH_CC: 20/30

## 2024-08-08 ASSESSMENT — SLIT LAMP EXAM - LIDS
COMMENTS: NORMAL
COMMENTS: NORMAL

## 2024-08-08 NOTE — NURSING NOTE
Chief Complaints and History of Present Illnesses   Patient presents with    Post Op (Ophthalmology) Both Eyes     Chief Complaint(s) and History of Present Illness(es)       Post Op (Ophthalmology) Both Eyes              Laterality: both eyes    Onset: months ago    Associated symptoms: flashes.  Negative for photophobia and floaters    Pain scale: 0/10              Comments    S/P Cataract extraction intraocular lens left eye 05/07/24 and right eye 4.23.24  Spoke to joanna about the glx are not made correctly.   Shira Walton COT 9:27 AM August 8, 2024

## 2024-08-08 NOTE — PROGRESS NOTES
CC: status post  Cataract extraction intraocular lens both eyes   Interval history: complaining of some flickering occasional. No eye pain today. [facility] improving. Reading ok but occasional misses letters  Previously referred by: from a patient of [facility] - Ms Jara  HPI: Martha Hayden is a 74 year old year-old patient with history of Age related macular degeneration and Cataracts. She says her glasses are current but she does not see as well as she used to, and no longer drives at night because of  glare. Has noticed more difficulty reading without her glasses close up as well as driving at night. She has stopped driving at night due to glare. She thinks she might be ready to move forward with cataract surgery.    PMH: MM in situ, anxiety, headache , insomnia, esophagitis; low back pain  Past ocular history: History of ocular migraines, and retinal tear.     Retinal Imaging:  Optical Coherence Tomography 08/08/24   Right eye with Epiretinal membrane; and cystic changes   Left eye: drusen and Retinal pigment epithelium changes in both eyes; no subretinal fluid; trace Epiretinal membrane     Fundus photos consistent with exam 09/07/23   Autofluorescence: macula hyper and hypoautofluorescent spots consistent with drusen    Assessment & Plan:  # Cataract extraction intraocular lens left eye 05/07/24   #Cataract extraction intraocular lens right eye 4.23.24  Intraocular lens in good position   With posterior capsular opacity (PCO) both eyes;   Patient would like to consider the procedure and will consider yag next follow up   Retina detachment and endophthalmitis precautions were discussed with the patient (increased blurry vision, drainage, new flashes, floaters or a curtain in the visual field) and was asked to return if any of the those occur    # Dry Age related macular degeneration both eyes   AREDS supplementation is recommended.  Weekly Amsler Grid use was discussed and training performed.  A diet of leafy green  vegetables was encouraged.  Return precautions were given.    # Posterior vitreous detachment (PVD) both eyes   Retina detachment precautions were discussed with the patient (presence or increased in flashes, floaters or a curtain in the visual field) and was asked to return if any of the those occur     # history of retina tear right eye   # VR adhesion left eye   Retina attached both eyes on scleral depression      # Epiretinal membrane right eye   With pseudohole and cystic changes   Observe    #myopia  Retina detachment precautions were discussed with the patient (presence or increased in flashes, floaters or a curtain in the visual field) and was asked to return if any of the those occur     PLAN:  recommend yag next follow up     ~~~~~~~~~~~~~~~~~~~~~~~~~~~~~~~~~~   Complete documentation of historical and exam elements from today's encounter can be found in the full encounter summary report (not reduplicated in this progress note).  I personally obtained the chief complaint(s) and history of present illness.  I confirmed and edited as necessary the review of systems, past medical/surgical history, family history, social history, and examination findings as documented by others; and I examined the patient myself.  I personally reviewed the relevant tests, images, and reports as documented above.  I formulated and edited as necessary the assessment and plan and discussed the findings and management plan with the patient and family    Dominique Augustine MD  Professor of Ophthalmology.   Vitreo-retinal surgeon   Department of Ophthalmology and Visual Neurosciences   [facility]  Phone: [phone]   Fax: [phone]

## 2024-08-24 ENCOUNTER — HEALTH MAINTENANCE LETTER (OUTPATIENT)
Age: 74
End: 2024-08-24

## 2024-10-22 ASSESSMENT — PATIENT HEALTH QUESTIONNAIRE - PHQ9: SUM OF ALL RESPONSES TO PHQ QUESTIONS 1-9: 13

## 2025-07-06 ENCOUNTER — HEALTH MAINTENANCE LETTER (OUTPATIENT)
Age: 75
End: 2025-07-06

## 2025-07-06 NOTE — TELEPHONE ENCOUNTER
MARSHA-7 SCORE 2017   Total Score - - -   Total Score - - 10 (moderate anxiety)   Total Score 0 10 10       PHQ-9 SCORE 2017   Total Score - - -   Total Score MyChart - - 10 (Moderate depression)   Total Score 0 10 13     Sent [facility] offering e-visit, office visit and home care recommendations - advised against taking  medications    Lois Jacobs RN    
Noted.    No further action needed from triage.  IWONA Stanley, BSN, RN    
Alert-The patient is alert, awake and responds to voice. The patient is oriented to time, place, and person. The triage nurse is able to obtain subjective information.

## 2025-09-03 DIAGNOSIS — H35.373 EPIRETINAL MEMBRANE (ERM) OF BOTH EYES: Primary | ICD-10-CM

## (undated) DEVICE — LINEN TOWEL PACK X5 5464

## (undated) DEVICE — EYE SHIELD PLASTIC

## (undated) DEVICE — TAPE MICROPORE 1"X1.5YD 1530S-1

## (undated) DEVICE — EYE PACK CUSTOM ANTERIOR 30DEG TIP CENTURION PPK6682-04

## (undated) DEVICE — EYE NDL RETROBULBAR ATKINSON 25GA 1.5" 581637

## (undated) DEVICE — PACK CATARACT CUSTOM ASC SEY15CPUMC

## (undated) DEVICE — EYE TIP IRRIGATION & ASPIRATION POLYMER 35D BENT 8065751511

## (undated) DEVICE — CATARACT BLADE PACK 2.5MM 58001898

## (undated) DEVICE — TAPE MICROPORE 2"X1.5YD 1530S-2

## (undated) DEVICE — GLOVE BIOGEL PI MICRO SZ 6.0 48560

## (undated) DEVICE — EYE CANN IRR 25GA HYDRODISSECTING 585037

## (undated) DEVICE — SOL WATER IRRIG 500ML BOTTLE 2F7113

## (undated) RX ORDER — ACETAMINOPHEN 325 MG/1
TABLET ORAL
Status: DISPENSED
Start: 2024-05-07

## (undated) RX ORDER — ACETAMINOPHEN 325 MG/1
TABLET ORAL
Status: DISPENSED
Start: 2024-04-23

## (undated) RX ORDER — PROPOFOL 10 MG/ML
INJECTION, EMULSION INTRAVENOUS
Status: DISPENSED
Start: 2024-04-23

## (undated) RX ORDER — ONDANSETRON 2 MG/ML
INJECTION INTRAMUSCULAR; INTRAVENOUS
Status: DISPENSED
Start: 2024-05-07

## (undated) RX ORDER — FENTANYL CITRATE 50 UG/ML
INJECTION, SOLUTION INTRAMUSCULAR; INTRAVENOUS
Status: DISPENSED
Start: 2024-04-23

## (undated) RX ORDER — DEXAMETHASONE SODIUM PHOSPHATE 4 MG/ML
INJECTION, SOLUTION INTRA-ARTICULAR; INTRALESIONAL; INTRAMUSCULAR; INTRAVENOUS; SOFT TISSUE
Status: DISPENSED
Start: 2024-05-07

## (undated) RX ORDER — FENTANYL CITRATE 50 UG/ML
INJECTION, SOLUTION INTRAMUSCULAR; INTRAVENOUS
Status: DISPENSED
Start: 2024-05-07

## (undated) RX ORDER — GLYCOPYRROLATE 0.2 MG/ML
INJECTION INTRAMUSCULAR; INTRAVENOUS
Status: DISPENSED
Start: 2024-04-23

## (undated) RX ORDER — LABETALOL HYDROCHLORIDE 5 MG/ML
INJECTION, SOLUTION INTRAVENOUS
Status: DISPENSED
Start: 2024-04-23

## (undated) RX ORDER — PROPOFOL 10 MG/ML
INJECTION, EMULSION INTRAVENOUS
Status: DISPENSED
Start: 2024-05-07

## (undated) RX ORDER — DEXAMETHASONE SODIUM PHOSPHATE 4 MG/ML
INJECTION, SOLUTION INTRA-ARTICULAR; INTRALESIONAL; INTRAMUSCULAR; INTRAVENOUS; SOFT TISSUE
Status: DISPENSED
Start: 2024-04-23

## (undated) RX ORDER — ONDANSETRON 2 MG/ML
INJECTION INTRAMUSCULAR; INTRAVENOUS
Status: DISPENSED
Start: 2024-04-23